# Patient Record
Sex: FEMALE | Race: WHITE | Employment: UNEMPLOYED
[De-identification: names, ages, dates, MRNs, and addresses within clinical notes are randomized per-mention and may not be internally consistent; named-entity substitution may affect disease eponyms.]

---

## 2017-01-11 ENCOUNTER — CARE COORDINATION (OUTPATIENT)
Dept: CARE COORDINATION | Facility: CLINIC | Age: 82
End: 2017-01-11

## 2017-03-03 ENCOUNTER — CARE COORDINATION (OUTPATIENT)
Dept: CARE COORDINATION | Facility: CLINIC | Age: 82
End: 2017-03-03

## 2017-03-20 ENCOUNTER — APPOINTMENT (OUTPATIENT)
Dept: CT IMAGING | Age: 82
DRG: 871 | End: 2017-03-20
Payer: MEDICARE

## 2017-03-20 ENCOUNTER — HOSPITAL ENCOUNTER (INPATIENT)
Age: 82
LOS: 10 days | Discharge: NURSING, REHAB OR LONG-TERM CARE | DRG: 871 | End: 2017-03-30
Attending: EMERGENCY MEDICINE | Admitting: FAMILY MEDICINE
Payer: MEDICARE

## 2017-03-20 ENCOUNTER — APPOINTMENT (OUTPATIENT)
Dept: GENERAL RADIOLOGY | Age: 82
DRG: 871 | End: 2017-03-20
Payer: MEDICARE

## 2017-03-20 DIAGNOSIS — J18.9 PNEUMONIA DUE TO ORGANISM: Primary | ICD-10-CM

## 2017-03-20 LAB
-: ABNORMAL
ABSOLUTE BANDS #: 1.3 K/UL (ref 0–1)
ABSOLUTE EOS #: 0 K/UL (ref 0–0.4)
ABSOLUTE LYMPH #: 0.43 K/UL (ref 1–4.8)
ABSOLUTE MONO #: 2.6 K/UL (ref 0.1–1.3)
ALBUMIN SERPL-MCNC: 2.1 G/DL (ref 3.5–5.2)
ALBUMIN/GLOBULIN RATIO: ABNORMAL (ref 1–2.5)
ALP BLD-CCNC: 134 U/L (ref 35–104)
ALT SERPL-CCNC: 11 U/L (ref 5–33)
AMORPHOUS: ABNORMAL
ANION GAP SERPL CALCULATED.3IONS-SCNC: 14 MMOL/L (ref 9–17)
AST SERPL-CCNC: 25 U/L
BACTERIA: ABNORMAL
BANDS: 3 % (ref 0–10)
BASOPHILS # BLD: 0 % (ref 0–2)
BASOPHILS ABSOLUTE: 0 K/UL (ref 0–0.2)
BILIRUB SERPL-MCNC: 2.22 MG/DL (ref 0.3–1.2)
BILIRUBIN URINE: ABNORMAL
BUN BLDV-MCNC: 29 MG/DL (ref 8–23)
BUN/CREAT BLD: ABNORMAL (ref 9–20)
CALCIUM SERPL-MCNC: 9.2 MG/DL (ref 8.6–10.4)
CASTS UA: ABNORMAL /LPF
CHLORIDE BLD-SCNC: 98 MMOL/L (ref 98–107)
CO2: 22 MMOL/L (ref 20–31)
COLOR: ABNORMAL
COMMENT UA: ABNORMAL
CREAT SERPL-MCNC: 0.63 MG/DL (ref 0.5–0.9)
CRYSTALS, UA: ABNORMAL /HPF
DIFFERENTIAL TYPE: ABNORMAL
EOSINOPHILS RELATIVE PERCENT: 0 % (ref 0–4)
EPITHELIAL CELLS UA: ABNORMAL /HPF
GFR AFRICAN AMERICAN: >60 ML/MIN
GFR NON-AFRICAN AMERICAN: >60 ML/MIN
GFR SERPL CREATININE-BSD FRML MDRD: ABNORMAL ML/MIN/{1.73_M2}
GFR SERPL CREATININE-BSD FRML MDRD: ABNORMAL ML/MIN/{1.73_M2}
GLUCOSE BLD-MCNC: 81 MG/DL (ref 65–105)
GLUCOSE BLD-MCNC: 97 MG/DL (ref 70–99)
GLUCOSE URINE: NEGATIVE
HCT VFR BLD CALC: 42.2 % (ref 36–46)
HEMOGLOBIN: 13.8 G/DL (ref 12–16)
INR BLD: 1.2
KETONES, URINE: ABNORMAL
LACTIC ACID: 2.3 MMOL/L (ref 0.5–2.2)
LEUKOCYTE ESTERASE, URINE: ABNORMAL
LYMPHOCYTES # BLD: 1 % (ref 24–44)
MAGNESIUM: 2.3 MG/DL (ref 1.6–2.6)
MCH RBC QN AUTO: 33.3 PG (ref 26–34)
MCHC RBC AUTO-ENTMCNC: 32.7 G/DL (ref 31–37)
MCV RBC AUTO: 102 FL (ref 80–100)
MONOCYTES # BLD: 6 % (ref 1–7)
MORPHOLOGY: ABNORMAL
MUCUS: ABNORMAL
NITRITE, URINE: POSITIVE
OTHER OBSERVATIONS UA: ABNORMAL
PARTIAL THROMBOPLASTIN TIME: 27.8 SEC (ref 23–31)
PDW BLD-RTO: 14.7 % (ref 11.5–14.9)
PH UA: 5.5 (ref 5–8)
PLATELET # BLD: 204 K/UL (ref 150–450)
PLATELET ESTIMATE: ABNORMAL
PMV BLD AUTO: 10.4 FL (ref 6–12)
POTASSIUM SERPL-SCNC: 4.1 MMOL/L (ref 3.7–5.3)
PROTEIN UA: ABNORMAL
PROTHROMBIN TIME: 12.7 SEC (ref 9.7–12)
RBC # BLD: 4.14 M/UL (ref 4–5.2)
RBC # BLD: ABNORMAL 10*6/UL
RBC UA: ABNORMAL /HPF
RENAL EPITHELIAL, UA: ABNORMAL /HPF
SEG NEUTROPHILS: 90 % (ref 36–66)
SEGMENTED NEUTROPHILS ABSOLUTE COUNT: 39.07 K/UL (ref 1.3–9.1)
SODIUM BLD-SCNC: 134 MMOL/L (ref 135–144)
SPECIFIC GRAVITY UA: 1.03 (ref 1–1.03)
TOTAL PROTEIN: 7.7 G/DL (ref 6.4–8.3)
TRICHOMONAS: ABNORMAL
TROPONIN INTERP: ABNORMAL
TROPONIN INTERP: ABNORMAL
TROPONIN T: 0.07 NG/ML
TROPONIN T: 0.09 NG/ML
TURBIDITY: ABNORMAL
URINE HGB: ABNORMAL
UROBILINOGEN, URINE: ABNORMAL
WBC # BLD: 43.4 K/UL (ref 3.5–11)
WBC # BLD: ABNORMAL 10*3/UL
WBC UA: ABNORMAL /HPF
YEAST: ABNORMAL

## 2017-03-20 PROCEDURE — 87040 BLOOD CULTURE FOR BACTERIA: CPT

## 2017-03-20 PROCEDURE — 85025 COMPLETE CBC W/AUTO DIFF WBC: CPT

## 2017-03-20 PROCEDURE — 2060000000 HC ICU INTERMEDIATE R&B

## 2017-03-20 PROCEDURE — 85610 PROTHROMBIN TIME: CPT

## 2017-03-20 PROCEDURE — 87186 SC STD MICRODIL/AGAR DIL: CPT

## 2017-03-20 PROCEDURE — 84484 ASSAY OF TROPONIN QUANT: CPT

## 2017-03-20 PROCEDURE — 99285 EMERGENCY DEPT VISIT HI MDM: CPT

## 2017-03-20 PROCEDURE — 2580000003 HC RX 258: Performed by: FAMILY MEDICINE

## 2017-03-20 PROCEDURE — 2580000003 HC RX 258: Performed by: EMERGENCY MEDICINE

## 2017-03-20 PROCEDURE — 93005 ELECTROCARDIOGRAM TRACING: CPT

## 2017-03-20 PROCEDURE — 85730 THROMBOPLASTIN TIME PARTIAL: CPT

## 2017-03-20 PROCEDURE — 70450 CT HEAD/BRAIN W/O DYE: CPT

## 2017-03-20 PROCEDURE — 81001 URINALYSIS AUTO W/SCOPE: CPT

## 2017-03-20 PROCEDURE — 6360000002 HC RX W HCPCS: Performed by: EMERGENCY MEDICINE

## 2017-03-20 PROCEDURE — 83605 ASSAY OF LACTIC ACID: CPT

## 2017-03-20 PROCEDURE — 87077 CULTURE AEROBIC IDENTIFY: CPT

## 2017-03-20 PROCEDURE — 80053 COMPREHEN METABOLIC PANEL: CPT

## 2017-03-20 PROCEDURE — 87086 URINE CULTURE/COLONY COUNT: CPT

## 2017-03-20 PROCEDURE — 87149 DNA/RNA DIRECT PROBE: CPT

## 2017-03-20 PROCEDURE — 83735 ASSAY OF MAGNESIUM: CPT

## 2017-03-20 PROCEDURE — 71010 XR CHEST PORTABLE: CPT

## 2017-03-20 PROCEDURE — 36415 COLL VENOUS BLD VENIPUNCTURE: CPT

## 2017-03-20 PROCEDURE — 87205 SMEAR GRAM STAIN: CPT

## 2017-03-20 PROCEDURE — 82947 ASSAY GLUCOSE BLOOD QUANT: CPT

## 2017-03-20 RX ORDER — 0.9 % SODIUM CHLORIDE 0.9 %
1000 INTRAVENOUS SOLUTION INTRAVENOUS ONCE
Status: COMPLETED | OUTPATIENT
Start: 2017-03-20 | End: 2017-03-20

## 2017-03-20 RX ORDER — WARFARIN SODIUM 5 MG/1
5 TABLET ORAL
Status: DISCONTINUED | OUTPATIENT
Start: 2017-03-20 | End: 2017-03-20

## 2017-03-20 RX ORDER — DONEPEZIL HYDROCHLORIDE 10 MG/1
10 TABLET, FILM COATED ORAL NIGHTLY
Status: DISCONTINUED | OUTPATIENT
Start: 2017-03-20 | End: 2017-03-30 | Stop reason: HOSPADM

## 2017-03-20 RX ORDER — SODIUM CHLORIDE 9 MG/ML
INJECTION, SOLUTION INTRAVENOUS CONTINUOUS
Status: DISCONTINUED | OUTPATIENT
Start: 2017-03-20 | End: 2017-03-21

## 2017-03-20 RX ORDER — SIMVASTATIN 20 MG
20 TABLET ORAL NIGHTLY
Status: DISCONTINUED | OUTPATIENT
Start: 2017-03-20 | End: 2017-03-30 | Stop reason: HOSPADM

## 2017-03-20 RX ORDER — WARFARIN SODIUM 5 MG/1
5 TABLET ORAL DAILY
Status: DISCONTINUED | OUTPATIENT
Start: 2017-03-20 | End: 2017-03-30 | Stop reason: HOSPADM

## 2017-03-20 RX ORDER — ASPIRIN 81 MG/1
81 TABLET ORAL DAILY
Status: DISCONTINUED | OUTPATIENT
Start: 2017-03-20 | End: 2017-03-30 | Stop reason: HOSPADM

## 2017-03-20 RX ORDER — ACETAMINOPHEN 325 MG/1
650 TABLET ORAL EVERY 4 HOURS PRN
Status: DISCONTINUED | OUTPATIENT
Start: 2017-03-20 | End: 2017-03-30 | Stop reason: HOSPADM

## 2017-03-20 RX ORDER — WARFARIN SODIUM 5 MG/1
5 TABLET ORAL DAILY
Status: DISCONTINUED | OUTPATIENT
Start: 2017-03-20 | End: 2017-03-20

## 2017-03-20 RX ORDER — POTASSIUM CHLORIDE 1.5 G/1.77G
20 POWDER, FOR SOLUTION ORAL DAILY
Status: DISCONTINUED | OUTPATIENT
Start: 2017-03-20 | End: 2017-03-30 | Stop reason: HOSPADM

## 2017-03-20 RX ORDER — SODIUM CHLORIDE 0.9 % (FLUSH) 0.9 %
10 SYRINGE (ML) INJECTION EVERY 12 HOURS SCHEDULED
Status: DISCONTINUED | OUTPATIENT
Start: 2017-03-20 | End: 2017-03-30 | Stop reason: HOSPADM

## 2017-03-20 RX ORDER — 0.9 % SODIUM CHLORIDE 0.9 %
500 INTRAVENOUS SOLUTION INTRAVENOUS ONCE
Status: COMPLETED | OUTPATIENT
Start: 2017-03-20 | End: 2017-03-20

## 2017-03-20 RX ORDER — SODIUM CHLORIDE 0.9 % (FLUSH) 0.9 %
10 SYRINGE (ML) INJECTION PRN
Status: DISCONTINUED | OUTPATIENT
Start: 2017-03-20 | End: 2017-03-30 | Stop reason: HOSPADM

## 2017-03-20 RX ORDER — LANOLIN ALCOHOL/MO/W.PET/CERES
1000 CREAM (GRAM) TOPICAL DAILY
Status: DISCONTINUED | OUTPATIENT
Start: 2017-03-20 | End: 2017-03-30 | Stop reason: HOSPADM

## 2017-03-20 RX ADMIN — AZITHROMYCIN MONOHYDRATE 500 MG: 500 INJECTION, POWDER, LYOPHILIZED, FOR SOLUTION INTRAVENOUS at 15:18

## 2017-03-20 RX ADMIN — SODIUM CHLORIDE: 9 INJECTION, SOLUTION INTRAVENOUS at 17:24

## 2017-03-20 RX ADMIN — CEFTRIAXONE SODIUM 1 G: 1 INJECTION, POWDER, FOR SOLUTION INTRAMUSCULAR; INTRAVENOUS at 14:45

## 2017-03-20 RX ADMIN — SODIUM CHLORIDE 1000 ML: 9 INJECTION, SOLUTION INTRAVENOUS at 14:45

## 2017-03-20 RX ADMIN — SODIUM CHLORIDE: 9 INJECTION, SOLUTION INTRAVENOUS at 21:53

## 2017-03-20 RX ADMIN — SODIUM CHLORIDE 500 ML: 9 INJECTION, SOLUTION INTRAVENOUS at 17:24

## 2017-03-20 ASSESSMENT — PAIN SCALES - GENERAL: PAINLEVEL_OUTOF10: 0

## 2017-03-21 ENCOUNTER — APPOINTMENT (OUTPATIENT)
Dept: CT IMAGING | Age: 82
DRG: 871 | End: 2017-03-21
Payer: MEDICARE

## 2017-03-21 ENCOUNTER — APPOINTMENT (OUTPATIENT)
Dept: GENERAL RADIOLOGY | Age: 82
DRG: 871 | End: 2017-03-21
Payer: MEDICARE

## 2017-03-21 PROBLEM — I95.9 HYPOTENSION: Status: ACTIVE | Noted: 2017-03-21

## 2017-03-21 PROBLEM — J18.9 SEPSIS DUE TO PNEUMONIA (HCC): Status: ACTIVE | Noted: 2017-03-21

## 2017-03-21 PROBLEM — A41.9 SEPSIS DUE TO PNEUMONIA (HCC): Status: ACTIVE | Noted: 2017-03-21

## 2017-03-21 LAB
ABSOLUTE BANDS #: 1.29 K/UL (ref 0–1)
ABSOLUTE EOS #: 0 K/UL (ref 0–0.4)
ABSOLUTE LYMPH #: 2.02 K/UL (ref 1–4.8)
ABSOLUTE MONO #: 0.92 K/UL (ref 0.1–1.3)
ANION GAP SERPL CALCULATED.3IONS-SCNC: 9 MMOL/L (ref 9–17)
BANDS: 7 % (ref 0–10)
BASOPHILS # BLD: 0 % (ref 0–2)
BASOPHILS ABSOLUTE: 0 K/UL (ref 0–0.2)
BUN BLDV-MCNC: 29 MG/DL (ref 8–23)
BUN/CREAT BLD: ABNORMAL (ref 9–20)
CALCIUM SERPL-MCNC: 8.7 MG/DL (ref 8.6–10.4)
CHLORIDE BLD-SCNC: 108 MMOL/L (ref 98–107)
CO2: 22 MMOL/L (ref 20–31)
CREAT SERPL-MCNC: 0.44 MG/DL (ref 0.5–0.9)
DIFFERENTIAL TYPE: ABNORMAL
DIRECT EXAM: NORMAL
EOSINOPHILS RELATIVE PERCENT: 0 % (ref 0–4)
GFR AFRICAN AMERICAN: >60 ML/MIN
GFR NON-AFRICAN AMERICAN: >60 ML/MIN
GFR SERPL CREATININE-BSD FRML MDRD: ABNORMAL ML/MIN/{1.73_M2}
GFR SERPL CREATININE-BSD FRML MDRD: ABNORMAL ML/MIN/{1.73_M2}
GLUCOSE BLD-MCNC: 116 MG/DL (ref 70–99)
HCT VFR BLD CALC: 37.7 % (ref 36–46)
HEMOGLOBIN: 12.3 G/DL (ref 12–16)
INR BLD: 1.2
LACTIC ACID: 1.3 MMOL/L (ref 0.5–2.2)
LYMPHOCYTES # BLD: 11 % (ref 24–44)
Lab: NORMAL
MCH RBC QN AUTO: 33.3 PG (ref 26–34)
MCHC RBC AUTO-ENTMCNC: 32.5 G/DL (ref 31–37)
MCV RBC AUTO: 102.2 FL (ref 80–100)
MONOCYTES # BLD: 5 % (ref 1–7)
MORPHOLOGY: ABNORMAL
PDW BLD-RTO: 14.4 % (ref 11.5–14.9)
PLATELET # BLD: 198 K/UL (ref 150–450)
PLATELET ESTIMATE: ABNORMAL
PMV BLD AUTO: 9.9 FL (ref 6–12)
POTASSIUM SERPL-SCNC: 3.6 MMOL/L (ref 3.7–5.3)
PROTHROMBIN TIME: 13 SEC (ref 9.7–12)
RBC # BLD: 3.69 M/UL (ref 4–5.2)
RBC # BLD: ABNORMAL 10*6/UL
SEG NEUTROPHILS: 77 % (ref 36–66)
SEGMENTED NEUTROPHILS ABSOLUTE COUNT: 14.17 K/UL (ref 1.3–9.1)
SODIUM BLD-SCNC: 139 MMOL/L (ref 135–144)
SPECIMEN DESCRIPTION: NORMAL
SPECIMEN DESCRIPTION: NORMAL
STATUS: NORMAL
WBC # BLD: 18.4 K/UL (ref 3.5–11)
WBC # BLD: ABNORMAL 10*3/UL

## 2017-03-21 PROCEDURE — 2500000003 HC RX 250 WO HCPCS: Performed by: FAMILY MEDICINE

## 2017-03-21 PROCEDURE — 87040 BLOOD CULTURE FOR BACTERIA: CPT

## 2017-03-21 PROCEDURE — 71250 CT THORAX DX C-: CPT

## 2017-03-21 PROCEDURE — 6360000002 HC RX W HCPCS: Performed by: FAMILY MEDICINE

## 2017-03-21 PROCEDURE — 85610 PROTHROMBIN TIME: CPT

## 2017-03-21 PROCEDURE — 36415 COLL VENOUS BLD VENIPUNCTURE: CPT

## 2017-03-21 PROCEDURE — 2580000003 HC RX 258: Performed by: FAMILY MEDICINE

## 2017-03-21 PROCEDURE — 74230 X-RAY XM SWLNG FUNCJ C+: CPT

## 2017-03-21 PROCEDURE — 99222 1ST HOSP IP/OBS MODERATE 55: CPT | Performed by: INTERNAL MEDICINE

## 2017-03-21 PROCEDURE — 99223 1ST HOSP IP/OBS HIGH 75: CPT | Performed by: FAMILY MEDICINE

## 2017-03-21 PROCEDURE — G8996 SWALLOW CURRENT STATUS: HCPCS

## 2017-03-21 PROCEDURE — 83605 ASSAY OF LACTIC ACID: CPT

## 2017-03-21 PROCEDURE — G8997 SWALLOW GOAL STATUS: HCPCS

## 2017-03-21 PROCEDURE — 87804 INFLUENZA ASSAY W/OPTIC: CPT

## 2017-03-21 PROCEDURE — 92610 EVALUATE SWALLOWING FUNCTION: CPT

## 2017-03-21 PROCEDURE — 2060000000 HC ICU INTERMEDIATE R&B

## 2017-03-21 PROCEDURE — 80048 BASIC METABOLIC PNL TOTAL CA: CPT

## 2017-03-21 PROCEDURE — 6360000002 HC RX W HCPCS: Performed by: INTERNAL MEDICINE

## 2017-03-21 PROCEDURE — 92611 MOTION FLUOROSCOPY/SWALLOW: CPT

## 2017-03-21 PROCEDURE — 85025 COMPLETE CBC W/AUTO DIFF WBC: CPT

## 2017-03-21 RX ORDER — DEXTROSE, SODIUM CHLORIDE, AND POTASSIUM CHLORIDE 5; .45; .3 G/100ML; G/100ML; G/100ML
INJECTION INTRAVENOUS CONTINUOUS
Status: DISCONTINUED | OUTPATIENT
Start: 2017-03-21 | End: 2017-03-23

## 2017-03-21 RX ORDER — LINEZOLID 2 MG/ML
600 INJECTION, SOLUTION INTRAVENOUS EVERY 12 HOURS
Status: DISCONTINUED | OUTPATIENT
Start: 2017-03-21 | End: 2017-03-22

## 2017-03-21 RX ORDER — 0.9 % SODIUM CHLORIDE 0.9 %
500 INTRAVENOUS SOLUTION INTRAVENOUS ONCE
Status: COMPLETED | OUTPATIENT
Start: 2017-03-21 | End: 2017-03-21

## 2017-03-21 RX ADMIN — SODIUM CHLORIDE 500 ML: 9 INJECTION, SOLUTION INTRAVENOUS at 00:48

## 2017-03-21 RX ADMIN — POTASSIUM CHLORIDE, DEXTROSE MONOHYDRATE AND SODIUM CHLORIDE: 300; 5; 450 INJECTION, SOLUTION INTRAVENOUS at 16:03

## 2017-03-21 RX ADMIN — CEFTRIAXONE SODIUM 1 G: 1 INJECTION, POWDER, FOR SOLUTION INTRAMUSCULAR; INTRAVENOUS at 16:03

## 2017-03-21 RX ADMIN — LINEZOLID 600 MG: 600 INJECTION, SOLUTION INTRAVENOUS at 13:12

## 2017-03-21 RX ADMIN — AZITHROMYCIN MONOHYDRATE 500 MG: 500 INJECTION, POWDER, LYOPHILIZED, FOR SOLUTION INTRAVENOUS at 16:50

## 2017-03-21 ASSESSMENT — PAIN SCALES - GENERAL: PAINLEVEL_OUTOF10: 0

## 2017-03-22 PROBLEM — B95.5 STREPTOCOCCAL BACTEREMIA: Status: ACTIVE | Noted: 2017-03-22

## 2017-03-22 PROBLEM — N39.0 URINARY TRACT INFECTION DUE TO KLEBSIELLA SPECIES: Status: ACTIVE | Noted: 2017-03-22

## 2017-03-22 PROBLEM — B96.89 URINARY TRACT INFECTION DUE TO KLEBSIELLA SPECIES: Status: ACTIVE | Noted: 2017-03-22

## 2017-03-22 PROBLEM — R78.81 STREPTOCOCCAL BACTEREMIA: Status: ACTIVE | Noted: 2017-03-22

## 2017-03-22 PROBLEM — J13 PNEUMOCOCCAL PNEUMONIA (HCC): Status: ACTIVE | Noted: 2017-03-22

## 2017-03-22 LAB
CULTURE: 12
CULTURE: 12
CULTURE: ABNORMAL
INR BLD: 1.1
LV EF: 55 %
LVEF MODALITY: NORMAL
Lab: ABNORMAL
ORGANISM: ABNORMAL
ORGANISM: ABNORMAL
PROTHROMBIN TIME: 11.9 SEC (ref 9.7–12)
SPECIMEN DESCRIPTION: ABNORMAL
STATUS: ABNORMAL

## 2017-03-22 PROCEDURE — 93306 TTE W/DOPPLER COMPLETE: CPT

## 2017-03-22 PROCEDURE — 6360000002 HC RX W HCPCS: Performed by: INTERNAL MEDICINE

## 2017-03-22 PROCEDURE — 85610 PROTHROMBIN TIME: CPT

## 2017-03-22 PROCEDURE — 2060000000 HC ICU INTERMEDIATE R&B

## 2017-03-22 PROCEDURE — 2500000003 HC RX 250 WO HCPCS: Performed by: FAMILY MEDICINE

## 2017-03-22 PROCEDURE — 99233 SBSQ HOSP IP/OBS HIGH 50: CPT | Performed by: INTERNAL MEDICINE

## 2017-03-22 PROCEDURE — 2580000003 HC RX 258: Performed by: INTERNAL MEDICINE

## 2017-03-22 PROCEDURE — 6360000002 HC RX W HCPCS: Performed by: FAMILY MEDICINE

## 2017-03-22 PROCEDURE — 99233 SBSQ HOSP IP/OBS HIGH 50: CPT | Performed by: FAMILY MEDICINE

## 2017-03-22 PROCEDURE — 92526 ORAL FUNCTION THERAPY: CPT

## 2017-03-22 PROCEDURE — 36415 COLL VENOUS BLD VENIPUNCTURE: CPT

## 2017-03-22 PROCEDURE — 99221 1ST HOSP IP/OBS SF/LOW 40: CPT | Performed by: INTERNAL MEDICINE

## 2017-03-22 RX ADMIN — POTASSIUM CHLORIDE, DEXTROSE MONOHYDRATE AND SODIUM CHLORIDE: 300; 5; 450 INJECTION, SOLUTION INTRAVENOUS at 05:46

## 2017-03-22 RX ADMIN — LINEZOLID 600 MG: 600 INJECTION, SOLUTION INTRAVENOUS at 01:13

## 2017-03-22 RX ADMIN — ENOXAPARIN SODIUM 40 MG: 40 INJECTION SUBCUTANEOUS at 08:55

## 2017-03-22 RX ADMIN — PIPERACILLIN SODIUM,TAZOBACTAM SODIUM 3.38 G: 3; .375 INJECTION, POWDER, FOR SOLUTION INTRAVENOUS at 20:20

## 2017-03-22 RX ADMIN — PIPERACILLIN SODIUM,TAZOBACTAM SODIUM 3.38 G: 3; .375 INJECTION, POWDER, FOR SOLUTION INTRAVENOUS at 13:15

## 2017-03-22 RX ADMIN — METOROPROLOL TARTRATE 2.5 MG: 5 INJECTION, SOLUTION INTRAVENOUS at 22:41

## 2017-03-22 RX ADMIN — POTASSIUM CHLORIDE, DEXTROSE MONOHYDRATE AND SODIUM CHLORIDE: 300; 5; 450 INJECTION, SOLUTION INTRAVENOUS at 22:41

## 2017-03-22 ASSESSMENT — PAIN SCALES - GENERAL
PAINLEVEL_OUTOF10: 0

## 2017-03-23 ENCOUNTER — ANESTHESIA (OUTPATIENT)
Dept: OPERATING ROOM | Age: 82
DRG: 871 | End: 2017-03-23
Payer: MEDICARE

## 2017-03-23 ENCOUNTER — ANESTHESIA EVENT (OUTPATIENT)
Dept: OPERATING ROOM | Age: 82
DRG: 871 | End: 2017-03-23
Payer: MEDICARE

## 2017-03-23 VITALS — DIASTOLIC BLOOD PRESSURE: 62 MMHG | OXYGEN SATURATION: 93 % | SYSTOLIC BLOOD PRESSURE: 103 MMHG

## 2017-03-23 LAB
INR BLD: 1.1
PROTHROMBIN TIME: 11.4 SEC (ref 9.7–12)

## 2017-03-23 PROCEDURE — BD12YZZ FLUOROSCOPY OF STOMACH USING OTHER CONTRAST: ICD-10-PCS | Performed by: INTERNAL MEDICINE

## 2017-03-23 PROCEDURE — 7100000000 HC PACU RECOVERY - FIRST 15 MIN: Performed by: INTERNAL MEDICINE

## 2017-03-23 PROCEDURE — 99232 SBSQ HOSP IP/OBS MODERATE 35: CPT | Performed by: FAMILY MEDICINE

## 2017-03-23 PROCEDURE — 3700000000 HC ANESTHESIA ATTENDED CARE: Performed by: INTERNAL MEDICINE

## 2017-03-23 PROCEDURE — 3700000001 HC ADD 15 MINUTES (ANESTHESIA): Performed by: INTERNAL MEDICINE

## 2017-03-23 PROCEDURE — 2500000003 HC RX 250 WO HCPCS: Performed by: FAMILY MEDICINE

## 2017-03-23 PROCEDURE — 6360000002 HC RX W HCPCS

## 2017-03-23 PROCEDURE — 85610 PROTHROMBIN TIME: CPT

## 2017-03-23 PROCEDURE — 3609018000 HC EGD ESOPHAGOGASTRODUODENOSCOPY PEG TUBE INSERTION: Performed by: INTERNAL MEDICINE

## 2017-03-23 PROCEDURE — 6360000002 HC RX W HCPCS: Performed by: INTERNAL MEDICINE

## 2017-03-23 PROCEDURE — 2580000003 HC RX 258: Performed by: ANESTHESIOLOGY

## 2017-03-23 PROCEDURE — 7100000001 HC PACU RECOVERY - ADDTL 15 MIN: Performed by: INTERNAL MEDICINE

## 2017-03-23 PROCEDURE — 2580000003 HC RX 258: Performed by: INTERNAL MEDICINE

## 2017-03-23 PROCEDURE — 0DH63UZ INSERTION OF FEEDING DEVICE INTO STOMACH, PERCUTANEOUS APPROACH: ICD-10-PCS | Performed by: INTERNAL MEDICINE

## 2017-03-23 PROCEDURE — 36415 COLL VENOUS BLD VENIPUNCTURE: CPT

## 2017-03-23 PROCEDURE — 43246 EGD PLACE GASTROSTOMY TUBE: CPT | Performed by: INTERNAL MEDICINE

## 2017-03-23 PROCEDURE — 2060000000 HC ICU INTERMEDIATE R&B

## 2017-03-23 PROCEDURE — 6370000000 HC RX 637 (ALT 250 FOR IP): Performed by: INTERNAL MEDICINE

## 2017-03-23 RX ORDER — DEXTROSE, SODIUM CHLORIDE, AND POTASSIUM CHLORIDE 5; .45; .3 G/100ML; G/100ML; G/100ML
INJECTION INTRAVENOUS CONTINUOUS
Status: DISCONTINUED | OUTPATIENT
Start: 2017-03-23 | End: 2017-03-30 | Stop reason: HOSPADM

## 2017-03-23 RX ORDER — PROPOFOL 10 MG/ML
INJECTION, EMULSION INTRAVENOUS PRN
Status: DISCONTINUED | OUTPATIENT
Start: 2017-03-23 | End: 2017-03-23 | Stop reason: SDUPTHER

## 2017-03-23 RX ORDER — SODIUM CHLORIDE, SODIUM LACTATE, POTASSIUM CHLORIDE, CALCIUM CHLORIDE 600; 310; 30; 20 MG/100ML; MG/100ML; MG/100ML; MG/100ML
INJECTION, SOLUTION INTRAVENOUS CONTINUOUS
Status: DISCONTINUED | OUTPATIENT
Start: 2017-03-23 | End: 2017-03-23

## 2017-03-23 RX ORDER — MIDAZOLAM HYDROCHLORIDE 1 MG/ML
INJECTION INTRAMUSCULAR; INTRAVENOUS PRN
Status: DISCONTINUED | OUTPATIENT
Start: 2017-03-23 | End: 2017-03-23 | Stop reason: SDUPTHER

## 2017-03-23 RX ADMIN — PIPERACILLIN SODIUM,TAZOBACTAM SODIUM 3.38 G: 3; .375 INJECTION, POWDER, FOR SOLUTION INTRAVENOUS at 03:05

## 2017-03-23 RX ADMIN — MIDAZOLAM HYDROCHLORIDE 1 MG: 1 INJECTION, SOLUTION INTRAMUSCULAR; INTRAVENOUS at 10:04

## 2017-03-23 RX ADMIN — POTASSIUM CHLORIDE, DEXTROSE MONOHYDRATE AND SODIUM CHLORIDE: 300; 5; 450 INJECTION, SOLUTION INTRAVENOUS at 20:58

## 2017-03-23 RX ADMIN — PROPOFOL 10 MG: 10 INJECTION, EMULSION INTRAVENOUS at 10:20

## 2017-03-23 RX ADMIN — PROPOFOL 10 MG: 10 INJECTION, EMULSION INTRAVENOUS at 10:12

## 2017-03-23 RX ADMIN — PROPOFOL 10 MG: 10 INJECTION, EMULSION INTRAVENOUS at 10:06

## 2017-03-23 RX ADMIN — PROPOFOL 10 MG: 10 INJECTION, EMULSION INTRAVENOUS at 10:21

## 2017-03-23 RX ADMIN — PROPOFOL 10 MG: 10 INJECTION, EMULSION INTRAVENOUS at 10:10

## 2017-03-23 RX ADMIN — PROPOFOL 10 MG: 10 INJECTION, EMULSION INTRAVENOUS at 10:17

## 2017-03-23 RX ADMIN — POTASSIUM CHLORIDE, DEXTROSE MONOHYDRATE AND SODIUM CHLORIDE: 300; 5; 450 INJECTION, SOLUTION INTRAVENOUS at 12:38

## 2017-03-23 RX ADMIN — PROPOFOL 10 MG: 10 INJECTION, EMULSION INTRAVENOUS at 10:28

## 2017-03-23 RX ADMIN — MIDAZOLAM HYDROCHLORIDE 1 MG: 1 INJECTION, SOLUTION INTRAMUSCULAR; INTRAVENOUS at 09:58

## 2017-03-23 RX ADMIN — SODIUM CHLORIDE, POTASSIUM CHLORIDE, SODIUM LACTATE AND CALCIUM CHLORIDE: 600; 310; 30; 20 INJECTION, SOLUTION INTRAVENOUS at 09:18

## 2017-03-23 RX ADMIN — PROPOFOL 10 MG: 10 INJECTION, EMULSION INTRAVENOUS at 10:24

## 2017-03-23 RX ADMIN — PROPOFOL 10 MG: 10 INJECTION, EMULSION INTRAVENOUS at 10:08

## 2017-03-23 RX ADMIN — PROPOFOL 10 MG: 10 INJECTION, EMULSION INTRAVENOUS at 10:05

## 2017-03-23 RX ADMIN — PIPERACILLIN SODIUM,TAZOBACTAM SODIUM 3.38 G: 3; .375 INJECTION, POWDER, FOR SOLUTION INTRAVENOUS at 20:58

## 2017-03-23 RX ADMIN — PROPOFOL 10 MG: 10 INJECTION, EMULSION INTRAVENOUS at 10:14

## 2017-03-23 ASSESSMENT — PAIN SCALES - GENERAL
PAINLEVEL_OUTOF10: 0

## 2017-03-23 ASSESSMENT — ENCOUNTER SYMPTOMS
STRIDOR: 0
SHORTNESS OF BREATH: 0

## 2017-03-24 ENCOUNTER — APPOINTMENT (OUTPATIENT)
Dept: GENERAL RADIOLOGY | Age: 82
DRG: 871 | End: 2017-03-24
Payer: MEDICARE

## 2017-03-24 LAB
ANION GAP SERPL CALCULATED.3IONS-SCNC: 9 MMOL/L (ref 9–17)
BUN BLDV-MCNC: 8 MG/DL (ref 8–23)
BUN/CREAT BLD: ABNORMAL (ref 9–20)
CALCIUM SERPL-MCNC: 8.4 MG/DL (ref 8.6–10.4)
CHLORIDE BLD-SCNC: 108 MMOL/L (ref 98–107)
CO2: 21 MMOL/L (ref 20–31)
CREAT SERPL-MCNC: 0.46 MG/DL (ref 0.5–0.9)
GFR AFRICAN AMERICAN: >60 ML/MIN
GFR NON-AFRICAN AMERICAN: >60 ML/MIN
GFR SERPL CREATININE-BSD FRML MDRD: ABNORMAL ML/MIN/{1.73_M2}
GFR SERPL CREATININE-BSD FRML MDRD: ABNORMAL ML/MIN/{1.73_M2}
GLUCOSE BLD-MCNC: 90 MG/DL (ref 70–99)
HCT VFR BLD CALC: 38.9 % (ref 36–46)
HEMOGLOBIN: 12.8 G/DL (ref 12–16)
INR BLD: 1.1
MCH RBC QN AUTO: 33.3 PG (ref 26–34)
MCHC RBC AUTO-ENTMCNC: 33.1 G/DL (ref 31–37)
MCV RBC AUTO: 100.6 FL (ref 80–100)
PDW BLD-RTO: 14.6 % (ref 11.5–14.9)
PLATELET # BLD: 186 K/UL (ref 150–450)
PMV BLD AUTO: 9.8 FL (ref 6–12)
POTASSIUM SERPL-SCNC: 4.9 MMOL/L (ref 3.7–5.3)
PROTHROMBIN TIME: 11.5 SEC (ref 9.7–12)
RBC # BLD: 3.86 M/UL (ref 4–5.2)
SODIUM BLD-SCNC: 138 MMOL/L (ref 135–144)
WBC # BLD: 10.1 K/UL (ref 3.5–11)

## 2017-03-24 PROCEDURE — 99232 SBSQ HOSP IP/OBS MODERATE 35: CPT | Performed by: FAMILY MEDICINE

## 2017-03-24 PROCEDURE — G8987 SELF CARE CURRENT STATUS: HCPCS

## 2017-03-24 PROCEDURE — 71020 XR CHEST STANDARD TWO VW: CPT

## 2017-03-24 PROCEDURE — 99233 SBSQ HOSP IP/OBS HIGH 50: CPT | Performed by: INTERNAL MEDICINE

## 2017-03-24 PROCEDURE — 85610 PROTHROMBIN TIME: CPT

## 2017-03-24 PROCEDURE — 2500000003 HC RX 250 WO HCPCS: Performed by: FAMILY MEDICINE

## 2017-03-24 PROCEDURE — G8988 SELF CARE GOAL STATUS: HCPCS

## 2017-03-24 PROCEDURE — 97161 PT EVAL LOW COMPLEX 20 MIN: CPT

## 2017-03-24 PROCEDURE — 36415 COLL VENOUS BLD VENIPUNCTURE: CPT

## 2017-03-24 PROCEDURE — 2060000000 HC ICU INTERMEDIATE R&B

## 2017-03-24 PROCEDURE — G8978 MOBILITY CURRENT STATUS: HCPCS

## 2017-03-24 PROCEDURE — 2580000003 HC RX 258: Performed by: INTERNAL MEDICINE

## 2017-03-24 PROCEDURE — 80048 BASIC METABOLIC PNL TOTAL CA: CPT

## 2017-03-24 PROCEDURE — 99232 SBSQ HOSP IP/OBS MODERATE 35: CPT | Performed by: INTERNAL MEDICINE

## 2017-03-24 PROCEDURE — 6370000000 HC RX 637 (ALT 250 FOR IP): Performed by: FAMILY MEDICINE

## 2017-03-24 PROCEDURE — 97165 OT EVAL LOW COMPLEX 30 MIN: CPT

## 2017-03-24 PROCEDURE — G8979 MOBILITY GOAL STATUS: HCPCS

## 2017-03-24 PROCEDURE — 6360000002 HC RX W HCPCS: Performed by: INTERNAL MEDICINE

## 2017-03-24 PROCEDURE — 85027 COMPLETE CBC AUTOMATED: CPT

## 2017-03-24 PROCEDURE — 92526 ORAL FUNCTION THERAPY: CPT

## 2017-03-24 RX ADMIN — SIMVASTATIN 20 MG: 20 TABLET, FILM COATED ORAL at 21:04

## 2017-03-24 RX ADMIN — DONEPEZIL HYDROCHLORIDE 10 MG: 10 TABLET, FILM COATED ORAL at 21:04

## 2017-03-24 RX ADMIN — PIPERACILLIN SODIUM,TAZOBACTAM SODIUM 3.38 G: 3; .375 INJECTION, POWDER, FOR SOLUTION INTRAVENOUS at 21:04

## 2017-03-24 RX ADMIN — PIPERACILLIN SODIUM,TAZOBACTAM SODIUM 3.38 G: 3; .375 INJECTION, POWDER, FOR SOLUTION INTRAVENOUS at 03:16

## 2017-03-24 RX ADMIN — POTASSIUM CHLORIDE, DEXTROSE MONOHYDRATE AND SODIUM CHLORIDE: 300; 5; 450 INJECTION, SOLUTION INTRAVENOUS at 17:27

## 2017-03-24 RX ADMIN — WARFARIN SODIUM 5 MG: 5 TABLET ORAL at 17:43

## 2017-03-24 RX ADMIN — PIPERACILLIN SODIUM,TAZOBACTAM SODIUM 3.38 G: 3; .375 INJECTION, POWDER, FOR SOLUTION INTRAVENOUS at 11:44

## 2017-03-24 RX ADMIN — POTASSIUM CHLORIDE, DEXTROSE MONOHYDRATE AND SODIUM CHLORIDE: 300; 5; 450 INJECTION, SOLUTION INTRAVENOUS at 03:16

## 2017-03-25 LAB
ANION GAP SERPL CALCULATED.3IONS-SCNC: 10 MMOL/L (ref 9–17)
BUN BLDV-MCNC: 5 MG/DL (ref 8–23)
BUN/CREAT BLD: ABNORMAL (ref 9–20)
CALCIUM SERPL-MCNC: 8.7 MG/DL (ref 8.6–10.4)
CHLORIDE BLD-SCNC: 106 MMOL/L (ref 98–107)
CO2: 22 MMOL/L (ref 20–31)
CREAT SERPL-MCNC: 0.43 MG/DL (ref 0.5–0.9)
GFR AFRICAN AMERICAN: >60 ML/MIN
GFR NON-AFRICAN AMERICAN: >60 ML/MIN
GFR SERPL CREATININE-BSD FRML MDRD: ABNORMAL ML/MIN/{1.73_M2}
GFR SERPL CREATININE-BSD FRML MDRD: ABNORMAL ML/MIN/{1.73_M2}
GLUCOSE BLD-MCNC: 103 MG/DL (ref 70–99)
HCT VFR BLD CALC: 43.3 % (ref 36–46)
HEMOGLOBIN: 13.8 G/DL (ref 12–16)
INR BLD: 1.1
MCH RBC QN AUTO: 33.1 PG (ref 26–34)
MCHC RBC AUTO-ENTMCNC: 31.9 G/DL (ref 31–37)
MCV RBC AUTO: 104 FL (ref 80–100)
PDW BLD-RTO: 15.2 % (ref 11.5–14.9)
PLATELET # BLD: 192 K/UL (ref 150–450)
PMV BLD AUTO: 9.4 FL (ref 6–12)
POTASSIUM SERPL-SCNC: 4.4 MMOL/L (ref 3.7–5.3)
PROTHROMBIN TIME: 11.8 SEC (ref 9.7–12)
RBC # BLD: 4.16 M/UL (ref 4–5.2)
SODIUM BLD-SCNC: 138 MMOL/L (ref 135–144)
WBC # BLD: 12.2 K/UL (ref 3.5–11)

## 2017-03-25 PROCEDURE — 99231 SBSQ HOSP IP/OBS SF/LOW 25: CPT | Performed by: FAMILY MEDICINE

## 2017-03-25 PROCEDURE — 2500000003 HC RX 250 WO HCPCS: Performed by: FAMILY MEDICINE

## 2017-03-25 PROCEDURE — 2580000003 HC RX 258: Performed by: INTERNAL MEDICINE

## 2017-03-25 PROCEDURE — 80048 BASIC METABOLIC PNL TOTAL CA: CPT

## 2017-03-25 PROCEDURE — 2060000000 HC ICU INTERMEDIATE R&B

## 2017-03-25 PROCEDURE — 6370000000 HC RX 637 (ALT 250 FOR IP): Performed by: FAMILY MEDICINE

## 2017-03-25 PROCEDURE — 99232 SBSQ HOSP IP/OBS MODERATE 35: CPT | Performed by: INTERNAL MEDICINE

## 2017-03-25 PROCEDURE — 36415 COLL VENOUS BLD VENIPUNCTURE: CPT

## 2017-03-25 PROCEDURE — 85027 COMPLETE CBC AUTOMATED: CPT

## 2017-03-25 PROCEDURE — 99233 SBSQ HOSP IP/OBS HIGH 50: CPT | Performed by: INTERNAL MEDICINE

## 2017-03-25 PROCEDURE — 6360000002 HC RX W HCPCS: Performed by: INTERNAL MEDICINE

## 2017-03-25 PROCEDURE — 6370000000 HC RX 637 (ALT 250 FOR IP): Performed by: INTERNAL MEDICINE

## 2017-03-25 PROCEDURE — 85610 PROTHROMBIN TIME: CPT

## 2017-03-25 RX ORDER — AMOXICILLIN AND CLAVULANATE POTASSIUM 875; 125 MG/1; MG/1
1 TABLET, FILM COATED ORAL EVERY 12 HOURS SCHEDULED
Status: DISCONTINUED | OUTPATIENT
Start: 2017-03-25 | End: 2017-03-27

## 2017-03-25 RX ADMIN — POTASSIUM CHLORIDE, DEXTROSE MONOHYDRATE AND SODIUM CHLORIDE: 300; 5; 450 INJECTION, SOLUTION INTRAVENOUS at 13:44

## 2017-03-25 RX ADMIN — PIPERACILLIN SODIUM,TAZOBACTAM SODIUM 3.38 G: 3; .375 INJECTION, POWDER, FOR SOLUTION INTRAVENOUS at 12:19

## 2017-03-25 RX ADMIN — WARFARIN SODIUM 5 MG: 5 TABLET ORAL at 18:26

## 2017-03-25 RX ADMIN — CYANOCOBALAMIN TAB 1000 MCG 1000 MCG: 1000 TAB at 09:33

## 2017-03-25 RX ADMIN — POTASSIUM CHLORIDE 20 MEQ: 1.5 POWDER, FOR SOLUTION ORAL at 09:33

## 2017-03-25 RX ADMIN — AMOXICILLIN AND CLAVULANATE POTASSIUM 1 TABLET: 875; 125 TABLET, FILM COATED ORAL at 22:00

## 2017-03-25 RX ADMIN — POTASSIUM CHLORIDE, DEXTROSE MONOHYDRATE AND SODIUM CHLORIDE: 300; 5; 450 INJECTION, SOLUTION INTRAVENOUS at 03:36

## 2017-03-25 RX ADMIN — VITAMIN D, TAB 1000IU (100/BT) 2000 UNITS: 25 TAB at 09:33

## 2017-03-25 RX ADMIN — DONEPEZIL HYDROCHLORIDE 10 MG: 10 TABLET, FILM COATED ORAL at 22:02

## 2017-03-25 RX ADMIN — SIMVASTATIN 20 MG: 20 TABLET, FILM COATED ORAL at 22:00

## 2017-03-25 RX ADMIN — PIPERACILLIN SODIUM,TAZOBACTAM SODIUM 3.38 G: 3; .375 INJECTION, POWDER, FOR SOLUTION INTRAVENOUS at 04:15

## 2017-03-25 RX ADMIN — ASPIRIN 81 MG: 81 TABLET, COATED ORAL at 09:33

## 2017-03-25 ASSESSMENT — PAIN SCALES - GENERAL: PAINLEVEL_OUTOF10: 0

## 2017-03-26 ENCOUNTER — APPOINTMENT (OUTPATIENT)
Dept: GENERAL RADIOLOGY | Age: 82
DRG: 871 | End: 2017-03-26
Payer: MEDICARE

## 2017-03-26 ENCOUNTER — APPOINTMENT (OUTPATIENT)
Dept: CT IMAGING | Age: 82
DRG: 871 | End: 2017-03-26
Payer: MEDICARE

## 2017-03-26 LAB
ANION GAP SERPL CALCULATED.3IONS-SCNC: 9 MMOL/L (ref 9–17)
BUN BLDV-MCNC: 3 MG/DL (ref 8–23)
BUN/CREAT BLD: ABNORMAL (ref 9–20)
CALCIUM SERPL-MCNC: 9 MG/DL (ref 8.6–10.4)
CHLORIDE BLD-SCNC: 105 MMOL/L (ref 98–107)
CO2: 22 MMOL/L (ref 20–31)
CREAT SERPL-MCNC: <0.4 MG/DL (ref 0.5–0.9)
GFR AFRICAN AMERICAN: ABNORMAL ML/MIN
GFR NON-AFRICAN AMERICAN: ABNORMAL ML/MIN
GFR SERPL CREATININE-BSD FRML MDRD: ABNORMAL ML/MIN/{1.73_M2}
GFR SERPL CREATININE-BSD FRML MDRD: ABNORMAL ML/MIN/{1.73_M2}
GLUCOSE BLD-MCNC: 97 MG/DL (ref 70–99)
HCT VFR BLD CALC: 40.8 % (ref 36–46)
HEMOGLOBIN: 12.8 G/DL (ref 12–16)
INR BLD: 1.5
LACTIC ACID: 2 MMOL/L (ref 0.5–2.2)
MCH RBC QN AUTO: 32.9 PG (ref 26–34)
MCHC RBC AUTO-ENTMCNC: 31.4 G/DL (ref 31–37)
MCV RBC AUTO: 104.9 FL (ref 80–100)
PDW BLD-RTO: 15.8 % (ref 11.5–14.9)
PLATELET # BLD: 195 K/UL (ref 150–450)
PMV BLD AUTO: 9.5 FL (ref 6–12)
POTASSIUM SERPL-SCNC: 5 MMOL/L (ref 3.7–5.3)
PROCALCITONIN: 0.15 NG/ML
PROTHROMBIN TIME: 15.8 SEC (ref 9.7–12)
RBC # BLD: 3.89 M/UL (ref 4–5.2)
SODIUM BLD-SCNC: 136 MMOL/L (ref 135–144)
WBC # BLD: 13.2 K/UL (ref 3.5–11)

## 2017-03-26 PROCEDURE — 6360000004 HC RX CONTRAST MEDICATION: Performed by: FAMILY MEDICINE

## 2017-03-26 PROCEDURE — 74020 XR ABDOMEN STANDARD: CPT

## 2017-03-26 PROCEDURE — 85027 COMPLETE CBC AUTOMATED: CPT

## 2017-03-26 PROCEDURE — 6370000000 HC RX 637 (ALT 250 FOR IP): Performed by: INTERNAL MEDICINE

## 2017-03-26 PROCEDURE — 2580000003 HC RX 258: Performed by: FAMILY MEDICINE

## 2017-03-26 PROCEDURE — 36415 COLL VENOUS BLD VENIPUNCTURE: CPT

## 2017-03-26 PROCEDURE — 80048 BASIC METABOLIC PNL TOTAL CA: CPT

## 2017-03-26 PROCEDURE — 6370000000 HC RX 637 (ALT 250 FOR IP): Performed by: FAMILY MEDICINE

## 2017-03-26 PROCEDURE — 99232 SBSQ HOSP IP/OBS MODERATE 35: CPT | Performed by: INTERNAL MEDICINE

## 2017-03-26 PROCEDURE — 74160 CT ABDOMEN W/CONTRAST: CPT

## 2017-03-26 PROCEDURE — 85610 PROTHROMBIN TIME: CPT

## 2017-03-26 PROCEDURE — 83605 ASSAY OF LACTIC ACID: CPT

## 2017-03-26 PROCEDURE — 2500000003 HC RX 250 WO HCPCS: Performed by: INTERNAL MEDICINE

## 2017-03-26 PROCEDURE — 2060000000 HC ICU INTERMEDIATE R&B

## 2017-03-26 PROCEDURE — 84145 PROCALCITONIN (PCT): CPT

## 2017-03-26 RX ORDER — LANOLIN ALCOHOL/MO/W.PET/CERES
1000 CREAM (GRAM) TOPICAL DAILY
Qty: 30 TABLET | Refills: 3 | Status: SHIPPED | OUTPATIENT
Start: 2017-03-26

## 2017-03-26 RX ORDER — SIMVASTATIN 40 MG
20 TABLET ORAL NIGHTLY
Qty: 30 TABLET | Refills: 3 | Status: SHIPPED | OUTPATIENT
Start: 2017-03-26

## 2017-03-26 RX ORDER — POTASSIUM CHLORIDE 1.5 G/1.77G
20 POWDER, FOR SOLUTION ORAL DAILY
Qty: 30 EACH | Refills: 3 | Status: SHIPPED | OUTPATIENT
Start: 2017-03-26

## 2017-03-26 RX ORDER — WARFARIN SODIUM 5 MG/1
5 TABLET ORAL DAILY
Qty: 30 TABLET | Refills: 3 | Status: SHIPPED | OUTPATIENT
Start: 2017-03-26

## 2017-03-26 RX ORDER — ACETAMINOPHEN 325 MG/1
650 TABLET ORAL EVERY 4 HOURS PRN
Qty: 120 TABLET | Refills: 3 | Status: SHIPPED | OUTPATIENT
Start: 2017-03-26

## 2017-03-26 RX ORDER — CHOLECALCIFEROL (VITAMIN D3) 125 MCG
1 TABLET ORAL DAILY
Qty: 30 TABLET | Refills: 11 | Status: SHIPPED | OUTPATIENT
Start: 2017-03-26

## 2017-03-26 RX ORDER — DONEPEZIL HYDROCHLORIDE 10 MG/1
10 TABLET, FILM COATED ORAL NIGHTLY
Qty: 30 TABLET | Refills: 3 | Status: SHIPPED | OUTPATIENT
Start: 2017-03-26

## 2017-03-26 RX ORDER — 0.9 % SODIUM CHLORIDE 0.9 %
100 INTRAVENOUS SOLUTION INTRAVENOUS ONCE
Status: COMPLETED | OUTPATIENT
Start: 2017-03-26 | End: 2017-03-26

## 2017-03-26 RX ORDER — AMOXICILLIN AND CLAVULANATE POTASSIUM 875; 125 MG/1; MG/1
1 TABLET, FILM COATED ORAL EVERY 12 HOURS SCHEDULED
Qty: 20 TABLET | Refills: 0
Start: 2017-03-26 | End: 2017-03-30 | Stop reason: HOSPADM

## 2017-03-26 RX ORDER — CIPROFLOXACIN 2 MG/ML
400 INJECTION, SOLUTION INTRAVENOUS EVERY 12 HOURS
Status: DISCONTINUED | OUTPATIENT
Start: 2017-03-26 | End: 2017-03-27

## 2017-03-26 RX ADMIN — VITAMIN D, TAB 1000IU (100/BT) 2000 UNITS: 25 TAB at 10:54

## 2017-03-26 RX ADMIN — METRONIDAZOLE 500 MG: 500 INJECTION, SOLUTION INTRAVENOUS at 21:58

## 2017-03-26 RX ADMIN — ASPIRIN 81 MG: 81 TABLET, COATED ORAL at 10:54

## 2017-03-26 RX ADMIN — IOVERSOL 130 ML: 741 INJECTION INTRA-ARTERIAL; INTRAVENOUS at 14:30

## 2017-03-26 RX ADMIN — SIMVASTATIN 20 MG: 20 TABLET, FILM COATED ORAL at 22:00

## 2017-03-26 RX ADMIN — AMOXICILLIN AND CLAVULANATE POTASSIUM 1 TABLET: 875; 125 TABLET, FILM COATED ORAL at 21:58

## 2017-03-26 RX ADMIN — CYANOCOBALAMIN TAB 1000 MCG 1000 MCG: 1000 TAB at 10:55

## 2017-03-26 RX ADMIN — AMOXICILLIN AND CLAVULANATE POTASSIUM 1 TABLET: 875; 125 TABLET, FILM COATED ORAL at 10:53

## 2017-03-26 RX ADMIN — WARFARIN SODIUM 5 MG: 5 TABLET ORAL at 18:49

## 2017-03-26 RX ADMIN — DONEPEZIL HYDROCHLORIDE 10 MG: 10 TABLET, FILM COATED ORAL at 21:59

## 2017-03-26 RX ADMIN — POTASSIUM CHLORIDE, DEXTROSE MONOHYDRATE AND SODIUM CHLORIDE: 300; 5; 450 INJECTION, SOLUTION INTRAVENOUS at 02:12

## 2017-03-26 RX ADMIN — METRONIDAZOLE 500 MG: 500 INJECTION, SOLUTION INTRAVENOUS at 16:09

## 2017-03-26 RX ADMIN — POTASSIUM CHLORIDE, DEXTROSE MONOHYDRATE AND SODIUM CHLORIDE: 300; 5; 450 INJECTION, SOLUTION INTRAVENOUS at 22:11

## 2017-03-26 RX ADMIN — SODIUM CHLORIDE 100 ML: 0.9 INJECTION, SOLUTION INTRAVENOUS at 14:30

## 2017-03-27 ENCOUNTER — APPOINTMENT (OUTPATIENT)
Dept: INTERVENTIONAL RADIOLOGY/VASCULAR | Age: 82
DRG: 871 | End: 2017-03-27
Payer: MEDICARE

## 2017-03-27 LAB
ANION GAP SERPL CALCULATED.3IONS-SCNC: 8 MMOL/L (ref 9–17)
BUN BLDV-MCNC: 4 MG/DL (ref 8–23)
BUN/CREAT BLD: ABNORMAL (ref 9–20)
CALCIUM SERPL-MCNC: 8.1 MG/DL (ref 8.6–10.4)
CHLORIDE BLD-SCNC: 103 MMOL/L (ref 98–107)
CO2: 22 MMOL/L (ref 20–31)
CREAT SERPL-MCNC: <0.4 MG/DL (ref 0.5–0.9)
CULTURE: NORMAL
CULTURE: NORMAL
GFR AFRICAN AMERICAN: ABNORMAL ML/MIN
GFR NON-AFRICAN AMERICAN: ABNORMAL ML/MIN
GFR SERPL CREATININE-BSD FRML MDRD: ABNORMAL ML/MIN/{1.73_M2}
GFR SERPL CREATININE-BSD FRML MDRD: ABNORMAL ML/MIN/{1.73_M2}
GLUCOSE BLD-MCNC: 101 MG/DL (ref 70–99)
HCT VFR BLD CALC: 35 % (ref 36–46)
HEMOGLOBIN: 10.9 G/DL (ref 12–16)
INR BLD: 1.9
Lab: NORMAL
MCH RBC QN AUTO: 32.6 PG (ref 26–34)
MCHC RBC AUTO-ENTMCNC: 31.2 G/DL (ref 31–37)
MCV RBC AUTO: 104.5 FL (ref 80–100)
PDW BLD-RTO: 14.8 % (ref 11.5–14.9)
PLATELET # BLD: 190 K/UL (ref 150–450)
PMV BLD AUTO: 8.9 FL (ref 6–12)
POTASSIUM SERPL-SCNC: 4.5 MMOL/L (ref 3.7–5.3)
PROTHROMBIN TIME: 20.4 SEC (ref 9.7–12)
RBC # BLD: 3.35 M/UL (ref 4–5.2)
SODIUM BLD-SCNC: 133 MMOL/L (ref 135–144)
SPECIMEN DESCRIPTION: NORMAL
SPECIMEN DESCRIPTION: NORMAL
STATUS: NORMAL
WBC # BLD: 14.9 K/UL (ref 3.5–11)

## 2017-03-27 PROCEDURE — 6370000000 HC RX 637 (ALT 250 FOR IP): Performed by: FAMILY MEDICINE

## 2017-03-27 PROCEDURE — 80048 BASIC METABOLIC PNL TOTAL CA: CPT

## 2017-03-27 PROCEDURE — 36415 COLL VENOUS BLD VENIPUNCTURE: CPT

## 2017-03-27 PROCEDURE — 2500000003 HC RX 250 WO HCPCS: Performed by: INTERNAL MEDICINE

## 2017-03-27 PROCEDURE — 2060000000 HC ICU INTERMEDIATE R&B

## 2017-03-27 PROCEDURE — 6360000002 HC RX W HCPCS: Performed by: INTERNAL MEDICINE

## 2017-03-27 PROCEDURE — 97530 THERAPEUTIC ACTIVITIES: CPT

## 2017-03-27 PROCEDURE — 85027 COMPLETE CBC AUTOMATED: CPT

## 2017-03-27 PROCEDURE — 85610 PROTHROMBIN TIME: CPT

## 2017-03-27 PROCEDURE — 99232 SBSQ HOSP IP/OBS MODERATE 35: CPT | Performed by: FAMILY MEDICINE

## 2017-03-27 PROCEDURE — 97110 THERAPEUTIC EXERCISES: CPT

## 2017-03-27 PROCEDURE — 49465 FLUORO EXAM OF G/COLON TUBE: CPT | Performed by: RADIOLOGY

## 2017-03-27 PROCEDURE — 99233 SBSQ HOSP IP/OBS HIGH 50: CPT | Performed by: INTERNAL MEDICINE

## 2017-03-27 PROCEDURE — 97535 SELF CARE MNGMENT TRAINING: CPT

## 2017-03-27 PROCEDURE — 6360000004 HC RX CONTRAST MEDICATION: Performed by: FAMILY MEDICINE

## 2017-03-27 PROCEDURE — 2580000003 HC RX 258: Performed by: FAMILY MEDICINE

## 2017-03-27 RX ORDER — LEVOFLOXACIN 5 MG/ML
500 INJECTION, SOLUTION INTRAVENOUS EVERY 24 HOURS
Status: DISCONTINUED | OUTPATIENT
Start: 2017-03-27 | End: 2017-03-28

## 2017-03-27 RX ADMIN — METRONIDAZOLE 500 MG: 500 INJECTION, SOLUTION INTRAVENOUS at 23:57

## 2017-03-27 RX ADMIN — IOTHALAMATE MEGLUMINE 50 ML: 600 INJECTION INTRAVASCULAR at 10:07

## 2017-03-27 RX ADMIN — DONEPEZIL HYDROCHLORIDE 10 MG: 10 TABLET, FILM COATED ORAL at 23:36

## 2017-03-27 RX ADMIN — Medication 10 ML: at 11:36

## 2017-03-27 RX ADMIN — POTASSIUM CHLORIDE 20 MEQ: 1.5 POWDER, FOR SOLUTION ORAL at 11:35

## 2017-03-27 RX ADMIN — METRONIDAZOLE 500 MG: 500 INJECTION, SOLUTION INTRAVENOUS at 06:19

## 2017-03-27 RX ADMIN — CIPROFLOXACIN 400 MG: 2 INJECTION, SOLUTION INTRAVENOUS at 03:10

## 2017-03-27 RX ADMIN — METRONIDAZOLE 500 MG: 500 INJECTION, SOLUTION INTRAVENOUS at 16:28

## 2017-03-27 RX ADMIN — ASPIRIN 81 MG: 81 TABLET, COATED ORAL at 11:34

## 2017-03-27 RX ADMIN — CYANOCOBALAMIN TAB 1000 MCG 1000 MCG: 1000 TAB at 11:35

## 2017-03-27 RX ADMIN — SIMVASTATIN 20 MG: 20 TABLET, FILM COATED ORAL at 23:36

## 2017-03-27 RX ADMIN — VITAMIN D, TAB 1000IU (100/BT) 2000 UNITS: 25 TAB at 11:34

## 2017-03-27 RX ADMIN — LEVOFLOXACIN 500 MG: 5 INJECTION, SOLUTION INTRAVENOUS at 15:38

## 2017-03-27 RX ADMIN — WARFARIN SODIUM 5 MG: 5 TABLET ORAL at 23:36

## 2017-03-27 RX ADMIN — METOPROLOL TARTRATE 25 MG: 25 TABLET ORAL at 11:34

## 2017-03-27 RX ADMIN — POTASSIUM CHLORIDE, DEXTROSE MONOHYDRATE AND SODIUM CHLORIDE: 300; 5; 450 INJECTION, SOLUTION INTRAVENOUS at 20:47

## 2017-03-27 ASSESSMENT — PAIN SCALES - GENERAL
PAINLEVEL_OUTOF10: 0
PAINLEVEL_OUTOF10: 0

## 2017-03-28 LAB
ANION GAP SERPL CALCULATED.3IONS-SCNC: 8 MMOL/L (ref 9–17)
BUN BLDV-MCNC: 5 MG/DL (ref 8–23)
BUN/CREAT BLD: ABNORMAL (ref 9–20)
CALCIUM SERPL-MCNC: 8 MG/DL (ref 8.6–10.4)
CHLORIDE BLD-SCNC: 103 MMOL/L (ref 98–107)
CO2: 24 MMOL/L (ref 20–31)
CREAT SERPL-MCNC: 0.4 MG/DL (ref 0.5–0.9)
GFR AFRICAN AMERICAN: >60 ML/MIN
GFR NON-AFRICAN AMERICAN: >60 ML/MIN
GFR SERPL CREATININE-BSD FRML MDRD: ABNORMAL ML/MIN/{1.73_M2}
GFR SERPL CREATININE-BSD FRML MDRD: ABNORMAL ML/MIN/{1.73_M2}
GLUCOSE BLD-MCNC: 103 MG/DL (ref 70–99)
HCT VFR BLD CALC: 28.8 % (ref 36–46)
HEMOGLOBIN: 9.3 G/DL (ref 12–16)
INR BLD: 2.6
MCH RBC QN AUTO: 32.5 PG (ref 26–34)
MCHC RBC AUTO-ENTMCNC: 32.3 G/DL (ref 31–37)
MCV RBC AUTO: 100.6 FL (ref 80–100)
PDW BLD-RTO: 14.4 % (ref 11.5–14.9)
PLATELET # BLD: 203 K/UL (ref 150–450)
PMV BLD AUTO: 9.5 FL (ref 6–12)
POTASSIUM SERPL-SCNC: 4.4 MMOL/L (ref 3.7–5.3)
PROTHROMBIN TIME: 28.1 SEC (ref 9.7–12)
RBC # BLD: 2.87 M/UL (ref 4–5.2)
SODIUM BLD-SCNC: 135 MMOL/L (ref 135–144)
WBC # BLD: 13.7 K/UL (ref 3.5–11)

## 2017-03-28 PROCEDURE — 6370000000 HC RX 637 (ALT 250 FOR IP): Performed by: FAMILY MEDICINE

## 2017-03-28 PROCEDURE — 92526 ORAL FUNCTION THERAPY: CPT

## 2017-03-28 PROCEDURE — 99232 SBSQ HOSP IP/OBS MODERATE 35: CPT | Performed by: INTERNAL MEDICINE

## 2017-03-28 PROCEDURE — 6370000000 HC RX 637 (ALT 250 FOR IP): Performed by: INTERNAL MEDICINE

## 2017-03-28 PROCEDURE — 2060000000 HC ICU INTERMEDIATE R&B

## 2017-03-28 PROCEDURE — 36415 COLL VENOUS BLD VENIPUNCTURE: CPT

## 2017-03-28 PROCEDURE — 2500000003 HC RX 250 WO HCPCS: Performed by: INTERNAL MEDICINE

## 2017-03-28 PROCEDURE — 99232 SBSQ HOSP IP/OBS MODERATE 35: CPT | Performed by: FAMILY MEDICINE

## 2017-03-28 PROCEDURE — 85027 COMPLETE CBC AUTOMATED: CPT

## 2017-03-28 PROCEDURE — 80048 BASIC METABOLIC PNL TOTAL CA: CPT

## 2017-03-28 PROCEDURE — 99233 SBSQ HOSP IP/OBS HIGH 50: CPT | Performed by: INTERNAL MEDICINE

## 2017-03-28 PROCEDURE — 85610 PROTHROMBIN TIME: CPT

## 2017-03-28 RX ORDER — LEVOFLOXACIN 500 MG/1
250 TABLET, FILM COATED ORAL DAILY
Status: DISCONTINUED | OUTPATIENT
Start: 2017-03-28 | End: 2017-03-30 | Stop reason: HOSPADM

## 2017-03-28 RX ORDER — METRONIDAZOLE 500 MG/1
500 TABLET ORAL EVERY 8 HOURS SCHEDULED
Status: DISCONTINUED | OUTPATIENT
Start: 2017-03-28 | End: 2017-03-30 | Stop reason: HOSPADM

## 2017-03-28 RX ADMIN — SIMVASTATIN 20 MG: 20 TABLET, FILM COATED ORAL at 21:53

## 2017-03-28 RX ADMIN — CYANOCOBALAMIN TAB 1000 MCG 1000 MCG: 1000 TAB at 07:38

## 2017-03-28 RX ADMIN — ASPIRIN 81 MG: 81 TABLET, COATED ORAL at 07:41

## 2017-03-28 RX ADMIN — POTASSIUM CHLORIDE 20 MEQ: 1.5 POWDER, FOR SOLUTION ORAL at 07:38

## 2017-03-28 RX ADMIN — DONEPEZIL HYDROCHLORIDE 10 MG: 10 TABLET, FILM COATED ORAL at 21:54

## 2017-03-28 RX ADMIN — METRONIDAZOLE 500 MG: 500 TABLET ORAL at 21:53

## 2017-03-28 RX ADMIN — METOPROLOL TARTRATE 25 MG: 25 TABLET ORAL at 07:41

## 2017-03-28 RX ADMIN — WARFARIN SODIUM 5 MG: 5 TABLET ORAL at 17:02

## 2017-03-28 RX ADMIN — POTASSIUM CHLORIDE, DEXTROSE MONOHYDRATE AND SODIUM CHLORIDE: 300; 5; 450 INJECTION, SOLUTION INTRAVENOUS at 20:11

## 2017-03-28 RX ADMIN — VITAMIN D, TAB 1000IU (100/BT) 2000 UNITS: 25 TAB at 07:41

## 2017-03-28 RX ADMIN — LEVOFLOXACIN 250 MG: 500 TABLET, FILM COATED ORAL at 15:18

## 2017-03-28 RX ADMIN — METRONIDAZOLE 500 MG: 500 INJECTION, SOLUTION INTRAVENOUS at 05:36

## 2017-03-28 RX ADMIN — METRONIDAZOLE 500 MG: 500 TABLET ORAL at 15:20

## 2017-03-28 ASSESSMENT — PAIN SCALES - GENERAL: PAINLEVEL_OUTOF10: 0

## 2017-03-29 LAB
ANION GAP SERPL CALCULATED.3IONS-SCNC: 7 MMOL/L (ref 9–17)
BUN BLDV-MCNC: 6 MG/DL (ref 8–23)
BUN/CREAT BLD: ABNORMAL (ref 9–20)
CALCIUM SERPL-MCNC: 8 MG/DL (ref 8.6–10.4)
CHLORIDE BLD-SCNC: 104 MMOL/L (ref 98–107)
CO2: 26 MMOL/L (ref 20–31)
CREAT SERPL-MCNC: 0.46 MG/DL (ref 0.5–0.9)
GFR AFRICAN AMERICAN: >60 ML/MIN
GFR NON-AFRICAN AMERICAN: >60 ML/MIN
GFR SERPL CREATININE-BSD FRML MDRD: ABNORMAL ML/MIN/{1.73_M2}
GFR SERPL CREATININE-BSD FRML MDRD: ABNORMAL ML/MIN/{1.73_M2}
GLUCOSE BLD-MCNC: 97 MG/DL (ref 70–99)
HCT VFR BLD CALC: 27.1 % (ref 36–46)
HEMOGLOBIN: 8.8 G/DL (ref 12–16)
INR BLD: 2.7
MCH RBC QN AUTO: 32.8 PG (ref 26–34)
MCHC RBC AUTO-ENTMCNC: 32.4 G/DL (ref 31–37)
MCV RBC AUTO: 101.1 FL (ref 80–100)
PDW BLD-RTO: 14.2 % (ref 11.5–14.9)
PLATELET # BLD: 231 K/UL (ref 150–450)
PMV BLD AUTO: 9.4 FL (ref 6–12)
POTASSIUM SERPL-SCNC: 4.3 MMOL/L (ref 3.7–5.3)
PROTHROMBIN TIME: 29.8 SEC (ref 9.7–12)
RBC # BLD: 2.68 M/UL (ref 4–5.2)
SODIUM BLD-SCNC: 137 MMOL/L (ref 135–144)
WBC # BLD: 13.9 K/UL (ref 3.5–11)

## 2017-03-29 PROCEDURE — 99233 SBSQ HOSP IP/OBS HIGH 50: CPT | Performed by: INTERNAL MEDICINE

## 2017-03-29 PROCEDURE — 6370000000 HC RX 637 (ALT 250 FOR IP): Performed by: INTERNAL MEDICINE

## 2017-03-29 PROCEDURE — 85610 PROTHROMBIN TIME: CPT

## 2017-03-29 PROCEDURE — 99232 SBSQ HOSP IP/OBS MODERATE 35: CPT | Performed by: INTERNAL MEDICINE

## 2017-03-29 PROCEDURE — 97535 SELF CARE MNGMENT TRAINING: CPT

## 2017-03-29 PROCEDURE — 36415 COLL VENOUS BLD VENIPUNCTURE: CPT

## 2017-03-29 PROCEDURE — 92526 ORAL FUNCTION THERAPY: CPT

## 2017-03-29 PROCEDURE — 97110 THERAPEUTIC EXERCISES: CPT

## 2017-03-29 PROCEDURE — 99232 SBSQ HOSP IP/OBS MODERATE 35: CPT | Performed by: FAMILY MEDICINE

## 2017-03-29 PROCEDURE — 2060000000 HC ICU INTERMEDIATE R&B

## 2017-03-29 PROCEDURE — 85027 COMPLETE CBC AUTOMATED: CPT

## 2017-03-29 PROCEDURE — 6370000000 HC RX 637 (ALT 250 FOR IP): Performed by: FAMILY MEDICINE

## 2017-03-29 PROCEDURE — 80048 BASIC METABOLIC PNL TOTAL CA: CPT

## 2017-03-29 PROCEDURE — 2500000003 HC RX 250 WO HCPCS: Performed by: INTERNAL MEDICINE

## 2017-03-29 RX ADMIN — POTASSIUM CHLORIDE, DEXTROSE MONOHYDRATE AND SODIUM CHLORIDE: 300; 5; 450 INJECTION, SOLUTION INTRAVENOUS at 19:20

## 2017-03-29 RX ADMIN — METRONIDAZOLE 500 MG: 500 TABLET ORAL at 05:55

## 2017-03-29 RX ADMIN — DONEPEZIL HYDROCHLORIDE 10 MG: 10 TABLET, FILM COATED ORAL at 22:09

## 2017-03-29 RX ADMIN — METRONIDAZOLE 500 MG: 500 TABLET ORAL at 15:22

## 2017-03-29 RX ADMIN — ASPIRIN 81 MG: 81 TABLET, COATED ORAL at 11:09

## 2017-03-29 RX ADMIN — VITAMIN D, TAB 1000IU (100/BT) 2000 UNITS: 25 TAB at 11:10

## 2017-03-29 RX ADMIN — METOPROLOL TARTRATE 25 MG: 25 TABLET ORAL at 15:22

## 2017-03-29 RX ADMIN — WARFARIN SODIUM 5 MG: 5 TABLET ORAL at 22:08

## 2017-03-29 RX ADMIN — POTASSIUM CHLORIDE 20 MEQ: 1.5 POWDER, FOR SOLUTION ORAL at 11:08

## 2017-03-29 RX ADMIN — LEVOFLOXACIN 250 MG: 500 TABLET, FILM COATED ORAL at 11:10

## 2017-03-29 RX ADMIN — METRONIDAZOLE 500 MG: 500 TABLET ORAL at 22:11

## 2017-03-29 RX ADMIN — CYANOCOBALAMIN TAB 1000 MCG 1000 MCG: 1000 TAB at 11:10

## 2017-03-29 RX ADMIN — SIMVASTATIN 20 MG: 20 TABLET, FILM COATED ORAL at 22:11

## 2017-03-29 ASSESSMENT — PAIN SCALES - GENERAL: PAINLEVEL_OUTOF10: 0

## 2017-03-30 VITALS
WEIGHT: 134.48 LBS | OXYGEN SATURATION: 98 % | TEMPERATURE: 97.6 F | SYSTOLIC BLOOD PRESSURE: 101 MMHG | RESPIRATION RATE: 16 BRPM | DIASTOLIC BLOOD PRESSURE: 55 MMHG | HEART RATE: 64 BPM | BODY MASS INDEX: 24.75 KG/M2 | HEIGHT: 62 IN

## 2017-03-30 LAB
ANION GAP SERPL CALCULATED.3IONS-SCNC: 6 MMOL/L (ref 9–17)
BUN BLDV-MCNC: 8 MG/DL (ref 8–23)
BUN/CREAT BLD: ABNORMAL (ref 9–20)
CALCIUM SERPL-MCNC: 7.9 MG/DL (ref 8.6–10.4)
CHLORIDE BLD-SCNC: 104 MMOL/L (ref 98–107)
CO2: 24 MMOL/L (ref 20–31)
CREAT SERPL-MCNC: 0.4 MG/DL (ref 0.5–0.9)
GFR AFRICAN AMERICAN: >60 ML/MIN
GFR NON-AFRICAN AMERICAN: >60 ML/MIN
GFR SERPL CREATININE-BSD FRML MDRD: ABNORMAL ML/MIN/{1.73_M2}
GFR SERPL CREATININE-BSD FRML MDRD: ABNORMAL ML/MIN/{1.73_M2}
GLUCOSE BLD-MCNC: 131 MG/DL (ref 70–99)
HCT VFR BLD CALC: 30.6 % (ref 36–46)
HEMOGLOBIN: 9.9 G/DL (ref 12–16)
INR BLD: 2.9
MCH RBC QN AUTO: 32.8 PG (ref 26–34)
MCHC RBC AUTO-ENTMCNC: 32.4 G/DL (ref 31–37)
MCV RBC AUTO: 101.3 FL (ref 80–100)
PDW BLD-RTO: 14.2 % (ref 11.5–14.9)
PLATELET # BLD: 217 K/UL (ref 150–450)
PMV BLD AUTO: 9.5 FL (ref 6–12)
POTASSIUM SERPL-SCNC: 4.5 MMOL/L (ref 3.7–5.3)
PROTHROMBIN TIME: 31.6 SEC (ref 9.7–12)
RBC # BLD: 3.02 M/UL (ref 4–5.2)
SODIUM BLD-SCNC: 134 MMOL/L (ref 135–144)
WBC # BLD: 16.3 K/UL (ref 3.5–11)

## 2017-03-30 PROCEDURE — 80048 BASIC METABOLIC PNL TOTAL CA: CPT

## 2017-03-30 PROCEDURE — 99239 HOSP IP/OBS DSCHRG MGMT >30: CPT | Performed by: FAMILY MEDICINE

## 2017-03-30 PROCEDURE — 36415 COLL VENOUS BLD VENIPUNCTURE: CPT

## 2017-03-30 PROCEDURE — 85610 PROTHROMBIN TIME: CPT

## 2017-03-30 PROCEDURE — 6370000000 HC RX 637 (ALT 250 FOR IP): Performed by: INTERNAL MEDICINE

## 2017-03-30 PROCEDURE — 6370000000 HC RX 637 (ALT 250 FOR IP): Performed by: FAMILY MEDICINE

## 2017-03-30 PROCEDURE — 85027 COMPLETE CBC AUTOMATED: CPT

## 2017-03-30 PROCEDURE — 97110 THERAPEUTIC EXERCISES: CPT

## 2017-03-30 RX ORDER — METRONIDAZOLE 500 MG/1
500 TABLET ORAL EVERY 8 HOURS SCHEDULED
Qty: 6 TABLET | Refills: 0 | Status: SHIPPED | OUTPATIENT
Start: 2017-03-30 | End: 2017-04-01

## 2017-03-30 RX ORDER — LEVOFLOXACIN 250 MG/1
250 TABLET ORAL DAILY
Qty: 2 TABLET | Refills: 0 | Status: SHIPPED | OUTPATIENT
Start: 2017-03-30 | End: 2017-04-01

## 2017-03-30 RX ADMIN — POTASSIUM CHLORIDE 20 MEQ: 1.5 POWDER, FOR SOLUTION ORAL at 09:50

## 2017-03-30 RX ADMIN — METRONIDAZOLE 500 MG: 500 TABLET ORAL at 05:13

## 2017-03-30 RX ADMIN — METRONIDAZOLE 500 MG: 500 TABLET ORAL at 16:33

## 2017-03-30 RX ADMIN — VITAMIN D, TAB 1000IU (100/BT) 2000 UNITS: 25 TAB at 09:50

## 2017-03-30 RX ADMIN — CYANOCOBALAMIN TAB 1000 MCG 1000 MCG: 1000 TAB at 09:50

## 2017-03-30 RX ADMIN — ASPIRIN 81 MG: 81 TABLET, COATED ORAL at 09:50

## 2017-03-30 RX ADMIN — LEVOFLOXACIN 250 MG: 500 TABLET, FILM COATED ORAL at 10:30

## 2017-03-30 ASSESSMENT — PAIN SCALES - GENERAL: PAINLEVEL_OUTOF10: 0

## 2017-03-31 LAB
EKG ATRIAL RATE: 65 BPM
EKG Q-T INTERVAL: 370 MS
EKG QRS DURATION: 86 MS
EKG QTC CALCULATION (BAZETT): 460 MS
EKG R AXIS: 2 DEGREES
EKG T AXIS: -38 DEGREES
EKG VENTRICULAR RATE: 93 BPM

## 2017-04-27 ENCOUNTER — CARE COORDINATION (OUTPATIENT)
Dept: CARE COORDINATION | Age: 82
End: 2017-04-27

## 2017-06-19 ENCOUNTER — TELEPHONE (OUTPATIENT)
Dept: GASTROENTEROLOGY | Age: 82
End: 2017-06-19

## 2017-06-20 RX ORDER — LEVOTHYROXINE SODIUM 0.03 MG/1
25 TABLET ORAL DAILY
COMMUNITY

## 2017-06-23 DIAGNOSIS — Z93.1 PEG (PERCUTANEOUS ENDOSCOPIC GASTROSTOMY) STATUS (HCC): Primary | ICD-10-CM

## 2017-06-23 DIAGNOSIS — R13.10 DYSPHAGIA, UNSPECIFIED TYPE: ICD-10-CM

## 2017-07-05 ENCOUNTER — HOSPITAL ENCOUNTER (OUTPATIENT)
Age: 82
Setting detail: OUTPATIENT SURGERY
Discharge: NURSING, REHAB OR LONG-TERM CARE | End: 2017-07-05
Attending: INTERNAL MEDICINE | Admitting: INTERNAL MEDICINE
Payer: MEDICARE

## 2017-07-05 VITALS
OXYGEN SATURATION: 100 % | HEIGHT: 62 IN | SYSTOLIC BLOOD PRESSURE: 114 MMHG | TEMPERATURE: 97.5 F | BODY MASS INDEX: 23.92 KG/M2 | WEIGHT: 130 LBS | DIASTOLIC BLOOD PRESSURE: 50 MMHG | RESPIRATION RATE: 18 BRPM | HEART RATE: 78 BPM

## 2017-07-05 PROCEDURE — 7100000010 HC PHASE II RECOVERY - FIRST 15 MIN: Performed by: INTERNAL MEDICINE

## 2017-07-05 PROCEDURE — 6360000002 HC RX W HCPCS: Performed by: INTERNAL MEDICINE

## 2017-07-05 PROCEDURE — 7100000011 HC PHASE II RECOVERY - ADDTL 15 MIN: Performed by: INTERNAL MEDICINE

## 2017-07-05 PROCEDURE — 99152 MOD SED SAME PHYS/QHP 5/>YRS: CPT | Performed by: INTERNAL MEDICINE

## 2017-07-05 PROCEDURE — 2580000003 HC RX 258: Performed by: INTERNAL MEDICINE

## 2017-07-05 PROCEDURE — 6370000000 HC RX 637 (ALT 250 FOR IP): Performed by: INTERNAL MEDICINE

## 2017-07-05 PROCEDURE — C1889 IMPLANT/INSERT DEVICE, NOC: HCPCS | Performed by: INTERNAL MEDICINE

## 2017-07-05 PROCEDURE — 99153 MOD SED SAME PHYS/QHP EA: CPT | Performed by: INTERNAL MEDICINE

## 2017-07-05 PROCEDURE — 3609018400 HC EGD ESOPHAGOGASTRODUODENOSCOPY TUBE INSERTION: Performed by: INTERNAL MEDICINE

## 2017-07-05 RX ORDER — MIDAZOLAM HYDROCHLORIDE 1 MG/ML
INJECTION INTRAMUSCULAR; INTRAVENOUS PRN
Status: DISCONTINUED | OUTPATIENT
Start: 2017-07-05 | End: 2017-07-05 | Stop reason: HOSPADM

## 2017-07-05 RX ORDER — SODIUM CHLORIDE 9 MG/ML
INJECTION, SOLUTION INTRAVENOUS CONTINUOUS
Status: DISCONTINUED | OUTPATIENT
Start: 2017-07-05 | End: 2017-07-05 | Stop reason: HOSPADM

## 2017-07-05 RX ADMIN — SODIUM CHLORIDE: 9 INJECTION, SOLUTION INTRAVENOUS at 08:30

## 2017-07-05 ASSESSMENT — PAIN - FUNCTIONAL ASSESSMENT: PAIN_FUNCTIONAL_ASSESSMENT: 0-10

## 2017-07-05 ASSESSMENT — PAIN SCALES - GENERAL
PAINLEVEL_OUTOF10: 0
PAINLEVEL_OUTOF10: 0

## 2017-08-04 ENCOUNTER — OFFICE VISIT (OUTPATIENT)
Dept: GASTROENTEROLOGY | Age: 82
End: 2017-08-04
Payer: MEDICARE

## 2017-08-04 VITALS
DIASTOLIC BLOOD PRESSURE: 65 MMHG | OXYGEN SATURATION: 98 % | SYSTOLIC BLOOD PRESSURE: 118 MMHG | TEMPERATURE: 98.1 F | HEART RATE: 54 BPM | HEIGHT: 62 IN | RESPIRATION RATE: 14 BRPM

## 2017-08-04 DIAGNOSIS — Z93.1 PEG (PERCUTANEOUS ENDOSCOPIC GASTROSTOMY) STATUS (HCC): Primary | ICD-10-CM

## 2017-08-04 PROCEDURE — 4040F PNEUMOC VAC/ADMIN/RCVD: CPT | Performed by: INTERNAL MEDICINE

## 2017-08-04 PROCEDURE — G8427 DOCREV CUR MEDS BY ELIG CLIN: HCPCS | Performed by: INTERNAL MEDICINE

## 2017-08-04 PROCEDURE — 1090F PRES/ABSN URINE INCON ASSESS: CPT | Performed by: INTERNAL MEDICINE

## 2017-08-04 PROCEDURE — G8400 PT W/DXA NO RESULTS DOC: HCPCS | Performed by: INTERNAL MEDICINE

## 2017-08-04 PROCEDURE — 1123F ACP DISCUSS/DSCN MKR DOCD: CPT | Performed by: INTERNAL MEDICINE

## 2017-08-04 PROCEDURE — G8420 CALC BMI NORM PARAMETERS: HCPCS | Performed by: INTERNAL MEDICINE

## 2017-08-04 PROCEDURE — 99213 OFFICE O/P EST LOW 20 MIN: CPT | Performed by: INTERNAL MEDICINE

## 2017-08-04 PROCEDURE — 1036F TOBACCO NON-USER: CPT | Performed by: INTERNAL MEDICINE

## 2017-08-04 ASSESSMENT — ENCOUNTER SYMPTOMS
EYES NEGATIVE: 1
TROUBLE SWALLOWING: 1
NAUSEA: 0
VOMITING: 0
BLOOD IN STOOL: 0
ANAL BLEEDING: 0
RECTAL PAIN: 0
DIARRHEA: 0
ABDOMINAL PAIN: 0
ALLERGIC/IMMUNOLOGIC NEGATIVE: 1
GASTROINTESTINAL NEGATIVE: 1
CONSTIPATION: 0
CHOKING: 1
ABDOMINAL DISTENTION: 0

## 2017-11-20 ENCOUNTER — APPOINTMENT (OUTPATIENT)
Dept: GENERAL RADIOLOGY | Age: 82
DRG: 178 | End: 2017-11-20
Payer: MEDICARE

## 2017-11-20 ENCOUNTER — HOSPITAL ENCOUNTER (INPATIENT)
Age: 82
LOS: 2 days | Discharge: NURSING, REHAB OR LONG-TERM CARE | DRG: 178 | End: 2017-11-22
Attending: EMERGENCY MEDICINE | Admitting: INTERNAL MEDICINE
Payer: MEDICARE

## 2017-11-20 DIAGNOSIS — J18.9 PNEUMONIA DUE TO ORGANISM: Primary | ICD-10-CM

## 2017-11-20 DIAGNOSIS — D68.9 COAGULOPATHY (HCC): ICD-10-CM

## 2017-11-20 DIAGNOSIS — I48.91 ATRIAL FIBRILLATION, UNSPECIFIED TYPE (HCC): ICD-10-CM

## 2017-11-20 PROBLEM — E03.9 HYPOTHYROID: Status: ACTIVE | Noted: 2017-11-20

## 2017-11-20 LAB
-: ABNORMAL
ABSOLUTE EOS #: 0.5 K/UL (ref 0–0.4)
ABSOLUTE IMMATURE GRANULOCYTE: ABNORMAL K/UL (ref 0–0.3)
ABSOLUTE LYMPH #: 3.47 K/UL (ref 1–4.8)
ABSOLUTE MONO #: 1.82 K/UL (ref 0.1–1.3)
AMORPHOUS: ABNORMAL
ANION GAP SERPL CALCULATED.3IONS-SCNC: 6 MMOL/L (ref 9–17)
BACTERIA: ABNORMAL
BASOPHILS # BLD: 0 %
BASOPHILS ABSOLUTE: 0 K/UL (ref 0–0.2)
BILIRUBIN URINE: NEGATIVE
BNP INTERPRETATION: ABNORMAL
BUN BLDV-MCNC: 32 MG/DL (ref 8–23)
BUN/CREAT BLD: ABNORMAL (ref 9–20)
CALCIUM SERPL-MCNC: 8.4 MG/DL (ref 8.6–10.4)
CASTS UA: ABNORMAL /LPF
CHLORIDE BLD-SCNC: 105 MMOL/L (ref 98–107)
CO2: 28 MMOL/L (ref 20–31)
COLOR: YELLOW
COMMENT UA: ABNORMAL
CREAT SERPL-MCNC: <0.4 MG/DL (ref 0.5–0.9)
CRYSTALS, UA: ABNORMAL /HPF
DIFFERENTIAL TYPE: ABNORMAL
EKG ATRIAL RATE: 63 BPM
EKG Q-T INTERVAL: 386 MS
EKG QRS DURATION: 80 MS
EKG QTC CALCULATION (BAZETT): 436 MS
EKG R AXIS: 47 DEGREES
EKG T AXIS: 19 DEGREES
EKG VENTRICULAR RATE: 77 BPM
EOSINOPHILS RELATIVE PERCENT: 3 %
EPITHELIAL CELLS UA: ABNORMAL /HPF
GFR AFRICAN AMERICAN: ABNORMAL ML/MIN
GFR NON-AFRICAN AMERICAN: ABNORMAL ML/MIN
GFR SERPL CREATININE-BSD FRML MDRD: ABNORMAL ML/MIN/{1.73_M2}
GFR SERPL CREATININE-BSD FRML MDRD: ABNORMAL ML/MIN/{1.73_M2}
GLUCOSE BLD-MCNC: 133 MG/DL (ref 70–99)
GLUCOSE URINE: NEGATIVE
HCT VFR BLD CALC: 30.5 % (ref 36–46)
HEMOGLOBIN: 10.2 G/DL (ref 12–16)
IMMATURE GRANULOCYTES: ABNORMAL %
INR BLD: 6.6
KETONES, URINE: NEGATIVE
LACTIC ACID, WHOLE BLOOD: NORMAL MMOL/L (ref 0.7–2.1)
LACTIC ACID: 1.1 MMOL/L (ref 0.5–2.2)
LACTIC ACID: 1.3 MMOL/L (ref 0.5–2.2)
LEUKOCYTE ESTERASE, URINE: ABNORMAL
LYMPHOCYTES # BLD: 21 %
MCH RBC QN AUTO: 34.5 PG (ref 26–34)
MCHC RBC AUTO-ENTMCNC: 33.4 G/DL (ref 31–37)
MCV RBC AUTO: 103.3 FL (ref 80–100)
MONOCYTES # BLD: 11 %
MORPHOLOGY: ABNORMAL
MUCUS: ABNORMAL
NITRITE, URINE: NEGATIVE
OTHER OBSERVATIONS UA: ABNORMAL
PARTIAL THROMBOPLASTIN TIME: 60.6 SEC (ref 23–31)
PDW BLD-RTO: 14.9 % (ref 11.5–14.9)
PH UA: 7.5 (ref 5–8)
PLATELET # BLD: 293 K/UL (ref 150–450)
PLATELET ESTIMATE: ABNORMAL
PMV BLD AUTO: 9.7 FL (ref 6–12)
POTASSIUM SERPL-SCNC: 4.5 MMOL/L (ref 3.7–5.3)
PRO-BNP: 2310 PG/ML
PROCALCITONIN: 0.08 NG/ML
PROTEIN UA: ABNORMAL
PROTHROMBIN TIME: 79.3 SEC (ref 9.7–12)
RBC # BLD: 2.95 M/UL (ref 4–5.2)
RBC # BLD: ABNORMAL 10*6/UL
RBC UA: ABNORMAL /HPF
RENAL EPITHELIAL, UA: ABNORMAL /HPF
SEG NEUTROPHILS: 65 %
SEGMENTED NEUTROPHILS ABSOLUTE COUNT: 10.71 K/UL (ref 1.3–9.1)
SODIUM BLD-SCNC: 139 MMOL/L (ref 135–144)
SPECIFIC GRAVITY UA: 1.02 (ref 1–1.03)
TRICHOMONAS: ABNORMAL
TROPONIN INTERP: NORMAL
TROPONIN T: <0.03 NG/ML
TURBIDITY: ABNORMAL
URINE HGB: NEGATIVE
UROBILINOGEN, URINE: NORMAL
WBC # BLD: 16.5 K/UL (ref 3.5–11)
WBC # BLD: ABNORMAL 10*3/UL
WBC UA: ABNORMAL /HPF
YEAST: ABNORMAL

## 2017-11-20 PROCEDURE — 87205 SMEAR GRAM STAIN: CPT

## 2017-11-20 PROCEDURE — 83880 ASSAY OF NATRIURETIC PEPTIDE: CPT

## 2017-11-20 PROCEDURE — 81001 URINALYSIS AUTO W/SCOPE: CPT

## 2017-11-20 PROCEDURE — 36415 COLL VENOUS BLD VENIPUNCTURE: CPT

## 2017-11-20 PROCEDURE — 2060000000 HC ICU INTERMEDIATE R&B

## 2017-11-20 PROCEDURE — 6370000000 HC RX 637 (ALT 250 FOR IP): Performed by: HOSPITALIST

## 2017-11-20 PROCEDURE — 2580000003 HC RX 258: Performed by: EMERGENCY MEDICINE

## 2017-11-20 PROCEDURE — 71010 XR CHEST LIMITED: CPT

## 2017-11-20 PROCEDURE — 87040 BLOOD CULTURE FOR BACTERIA: CPT

## 2017-11-20 PROCEDURE — 85025 COMPLETE CBC W/AUTO DIFF WBC: CPT

## 2017-11-20 PROCEDURE — 2580000003 HC RX 258: Performed by: HOSPITALIST

## 2017-11-20 PROCEDURE — 99285 EMERGENCY DEPT VISIT HI MDM: CPT

## 2017-11-20 PROCEDURE — 83605 ASSAY OF LACTIC ACID: CPT

## 2017-11-20 PROCEDURE — 6370000000 HC RX 637 (ALT 250 FOR IP): Performed by: RADIOLOGY

## 2017-11-20 PROCEDURE — 73060 X-RAY EXAM OF HUMERUS: CPT

## 2017-11-20 PROCEDURE — 6360000002 HC RX W HCPCS: Performed by: EMERGENCY MEDICINE

## 2017-11-20 PROCEDURE — 93005 ELECTROCARDIOGRAM TRACING: CPT

## 2017-11-20 PROCEDURE — 6360000002 HC RX W HCPCS: Performed by: HOSPITALIST

## 2017-11-20 PROCEDURE — 94761 N-INVAS EAR/PLS OXIMETRY MLT: CPT

## 2017-11-20 PROCEDURE — 87149 DNA/RNA DIRECT PROBE: CPT

## 2017-11-20 PROCEDURE — 94640 AIRWAY INHALATION TREATMENT: CPT

## 2017-11-20 PROCEDURE — 84145 PROCALCITONIN (PCT): CPT

## 2017-11-20 PROCEDURE — 84484 ASSAY OF TROPONIN QUANT: CPT

## 2017-11-20 PROCEDURE — 85730 THROMBOPLASTIN TIME PARTIAL: CPT

## 2017-11-20 PROCEDURE — 96365 THER/PROPH/DIAG IV INF INIT: CPT

## 2017-11-20 PROCEDURE — 80048 BASIC METABOLIC PNL TOTAL CA: CPT

## 2017-11-20 PROCEDURE — 85610 PROTHROMBIN TIME: CPT

## 2017-11-20 RX ORDER — FAMOTIDINE 20 MG/1
20 TABLET, FILM COATED ORAL 2 TIMES DAILY
Status: DISCONTINUED | OUTPATIENT
Start: 2017-11-20 | End: 2017-11-23 | Stop reason: HOSPADM

## 2017-11-20 RX ORDER — ACETAMINOPHEN 325 MG/1
650 TABLET ORAL EVERY 4 HOURS PRN
Status: DISCONTINUED | OUTPATIENT
Start: 2017-11-20 | End: 2017-11-20 | Stop reason: SDUPTHER

## 2017-11-20 RX ORDER — LEVOTHYROXINE SODIUM 0.03 MG/1
25 TABLET ORAL DAILY
Status: DISCONTINUED | OUTPATIENT
Start: 2017-11-20 | End: 2017-11-23 | Stop reason: HOSPADM

## 2017-11-20 RX ORDER — SIMVASTATIN 20 MG
20 TABLET ORAL NIGHTLY
Status: DISCONTINUED | OUTPATIENT
Start: 2017-11-20 | End: 2017-11-23 | Stop reason: HOSPADM

## 2017-11-20 RX ORDER — HYDROCODONE BITARTRATE AND ACETAMINOPHEN 5; 325 MG/1; MG/1
1 TABLET ORAL EVERY 6 HOURS PRN
Status: DISCONTINUED | OUTPATIENT
Start: 2017-11-20 | End: 2017-11-23 | Stop reason: HOSPADM

## 2017-11-20 RX ORDER — SODIUM CHLORIDE 0.9 % (FLUSH) 0.9 %
10 SYRINGE (ML) INJECTION EVERY 12 HOURS SCHEDULED
Status: DISCONTINUED | OUTPATIENT
Start: 2017-11-20 | End: 2017-11-23 | Stop reason: HOSPADM

## 2017-11-20 RX ORDER — SODIUM CHLORIDE 9 MG/ML
INJECTION, SOLUTION INTRAVENOUS CONTINUOUS
Status: DISCONTINUED | OUTPATIENT
Start: 2017-11-20 | End: 2017-11-20

## 2017-11-20 RX ORDER — ONDANSETRON 2 MG/ML
4 INJECTION INTRAMUSCULAR; INTRAVENOUS EVERY 6 HOURS PRN
Status: DISCONTINUED | OUTPATIENT
Start: 2017-11-20 | End: 2017-11-23 | Stop reason: HOSPADM

## 2017-11-20 RX ORDER — HYDROCODONE BITARTRATE AND ACETAMINOPHEN 5; 325 MG/1; MG/1
2 TABLET ORAL EVERY 6 HOURS PRN
Status: DISCONTINUED | OUTPATIENT
Start: 2017-11-20 | End: 2017-11-23 | Stop reason: HOSPADM

## 2017-11-20 RX ORDER — BISACODYL 10 MG
10 SUPPOSITORY, RECTAL RECTAL DAILY PRN
Status: DISCONTINUED | OUTPATIENT
Start: 2017-11-20 | End: 2017-11-23 | Stop reason: HOSPADM

## 2017-11-20 RX ORDER — SODIUM CHLORIDE 0.9 % (FLUSH) 0.9 %
10 SYRINGE (ML) INJECTION PRN
Status: DISCONTINUED | OUTPATIENT
Start: 2017-11-20 | End: 2017-11-23 | Stop reason: HOSPADM

## 2017-11-20 RX ORDER — DONEPEZIL HYDROCHLORIDE 10 MG/1
10 TABLET, FILM COATED ORAL NIGHTLY
Status: DISCONTINUED | OUTPATIENT
Start: 2017-11-20 | End: 2017-11-23 | Stop reason: HOSPADM

## 2017-11-20 RX ORDER — ACETAMINOPHEN 325 MG/1
650 TABLET ORAL EVERY 4 HOURS PRN
Status: DISCONTINUED | OUTPATIENT
Start: 2017-11-20 | End: 2017-11-23 | Stop reason: HOSPADM

## 2017-11-20 RX ORDER — ASPIRIN 81 MG/1
81 TABLET ORAL DAILY
Status: DISCONTINUED | OUTPATIENT
Start: 2017-11-20 | End: 2017-11-21 | Stop reason: CLARIF

## 2017-11-20 RX ORDER — NICOTINE 21 MG/24HR
1 PATCH, TRANSDERMAL 24 HOURS TRANSDERMAL DAILY PRN
Status: DISCONTINUED | OUTPATIENT
Start: 2017-11-20 | End: 2017-11-23 | Stop reason: HOSPADM

## 2017-11-20 RX ORDER — DOCUSATE SODIUM 100 MG/1
100 CAPSULE, LIQUID FILLED ORAL 2 TIMES DAILY
Status: DISCONTINUED | OUTPATIENT
Start: 2017-11-20 | End: 2017-11-20 | Stop reason: CLARIF

## 2017-11-20 RX ORDER — IPRATROPIUM BROMIDE AND ALBUTEROL SULFATE 2.5; .5 MG/3ML; MG/3ML
1 SOLUTION RESPIRATORY (INHALATION)
Status: DISCONTINUED | OUTPATIENT
Start: 2017-11-20 | End: 2017-11-23 | Stop reason: HOSPADM

## 2017-11-20 RX ORDER — ALBUTEROL SULFATE 2.5 MG/3ML
2.5 SOLUTION RESPIRATORY (INHALATION)
Status: DISCONTINUED | OUTPATIENT
Start: 2017-11-20 | End: 2017-11-23 | Stop reason: HOSPADM

## 2017-11-20 RX ADMIN — MICONAZOLE NITRATE: 20 CREAM TOPICAL at 22:27

## 2017-11-20 RX ADMIN — DONEPEZIL HYDROCHLORIDE 10 MG: 10 TABLET, FILM COATED ORAL at 22:27

## 2017-11-20 RX ADMIN — IPRATROPIUM BROMIDE AND ALBUTEROL SULFATE 1 AMPULE: .5; 3 SOLUTION RESPIRATORY (INHALATION) at 15:13

## 2017-11-20 RX ADMIN — MICONAZOLE NITRATE: 20 CREAM TOPICAL at 15:11

## 2017-11-20 RX ADMIN — VANCOMYCIN HYDROCHLORIDE 1000 MG: 10 INJECTION, POWDER, LYOPHILIZED, FOR SOLUTION INTRAVENOUS at 23:35

## 2017-11-20 RX ADMIN — SIMVASTATIN 20 MG: 20 TABLET, FILM COATED ORAL at 22:47

## 2017-11-20 RX ADMIN — PIPERACILLIN SODIUM,TAZOBACTAM SODIUM 3.38 G: 3; .375 INJECTION, POWDER, FOR SOLUTION INTRAVENOUS at 18:34

## 2017-11-20 RX ADMIN — DOCUSATE SODIUM 100 MG: 50 LIQUID ORAL at 23:35

## 2017-11-20 RX ADMIN — VANCOMYCIN HYDROCHLORIDE 1000 MG: 1 INJECTION, POWDER, LYOPHILIZED, FOR SOLUTION INTRAVENOUS at 12:00

## 2017-11-20 RX ADMIN — FAMOTIDINE 20 MG: 20 TABLET, FILM COATED ORAL at 22:27

## 2017-11-20 RX ADMIN — IPRATROPIUM BROMIDE AND ALBUTEROL SULFATE 1 AMPULE: .5; 3 SOLUTION RESPIRATORY (INHALATION) at 21:15

## 2017-11-20 RX ADMIN — PIPERACILLIN SODIUM AND TAZOBACTAM SODIUM 3.38 G: 3; .375 INJECTION, POWDER, LYOPHILIZED, FOR SOLUTION INTRAVENOUS at 11:19

## 2017-11-20 ASSESSMENT — ENCOUNTER SYMPTOMS
VOMITING: 0
BLURRED VISION: 0
SPUTUM PRODUCTION: 0
COUGH: 0
ABDOMINAL PAIN: 0
SHORTNESS OF BREATH: 0
DOUBLE VISION: 0
NAUSEA: 0

## 2017-11-20 ASSESSMENT — PAIN SCALES - GENERAL
PAINLEVEL_OUTOF10: 0
PAINLEVEL_OUTOF10: 0
PAINLEVEL_OUTOF10: 9

## 2017-11-20 NOTE — ED NOTES
Pt presents from [facility]. Pt states she has no complaints and wants to go home. Rodrigues  states they were toned out for shortness of breath and on arrival found pt sp02 to be 88%. Pt with nasal cannula in place with oxygen turned off. Squad turned oxygen and o2 saturation immediately went to 98%. Pt on 2liters of 02 normally. POA called with request to transport.       Mont Goldberg, RN  11/20/17 7863

## 2017-11-20 NOTE — FLOWSHEET NOTE
11/20/17 1415   Encounter Summary   [facility] [address] Completed   Spiritual/Faith   Intervention Contacted support as requested per patient/family request   Who?  [facility] [address]   Why? spiritual support   At Request Of patient

## 2017-11-20 NOTE — ED PROVIDER NOTES
[address] [facility]  eMERGENCY dEPARTMENT eNCOUnter    Pt Name: Mariana Cox  MRN: [id number]  [place] 1935  Date of evaluation: 11/20/17  CHIEF COMPLAINT       Chief Complaint   Patient presents with    Shortness of Breath     HISTORY OF PRESENT ILLNESS   HPI     72-year-old female with past medical history as listed below presents to the ER from an ECF for shortness of breath. Per EMS report, patient was noted to be hypoxic at 88%. Patient is to be on oxygen, 2 L at all times and was noted not to have oxygen turned on at that time. O2 was turned back on and patient oxygen saturation jones to 98%. Patient denies all complaints at this time. REVIEW OF SYSTEMS     Review of Systems   All other systems reviewed and are negative.     PAST MEDICAL HISTORY     Past Medical History:   Diagnosis Date    Asthma     Cancer [facility])     breast left    Cerebral artery occlusion with cerebral infarction ([facility]     Dementia     Depression     Essential hypertension 11/3/2016    Gastric bypass status for obesity     Hyperlipidemia     Hypothyroid 11/20/2017    Lymphadenopathy      SURGICAL HISTORY       Past Surgical History:   Procedure Laterality Date    NM EGD PERCUTANEOUS PLACEMENT GASTROSTOMY TUBE N/A 3/23/2017    PEG TUBE INSERTION performed by Belle Sandoval MD at [address] [facility]      UPPER GASTROINTESTINAL ENDOSCOPY  03/23/2017     disrupted gastric bypass surgery, # 200 G tube inserted    UPPER GASTROINTESTINAL ENDOSCOPY  07/05/2017    DANIEL tube exchange    UPPER GASTROINTESTINAL ENDOSCOPY N/A 7/5/2017    EGD ESOPHAGOGASTRODUODENOSCOPY WITH EXCHANGE OF DANIEL TUBE performed by Belle Sandoval MD at [facility]       Current Discharge Medication List      CONTINUE these medications which have NOT CHANGED    Details   levothyroxine (SYNTHROID) 25 MCG tablet Take 25 mcg by mouth Daily Height: 5' 1.81\" (1.57 m)        The patient was given the following medications while in the emergency department:  Orders Placed This Encounter   Medications    piperacillin-tazobactam (ZOSYN) 3.375 g in dextrose 5 % 50 mL IVPB (mini-bag)    vancomycin 1000 mg IVPB in 250 mL D5W addavial    DISCONTD: acetaminophen (TYLENOL) tablet 650 mg    aspirin EC tablet 81 mg    donepezil (ARICEPT) tablet 10 mg    levothyroxine (SYNTHROID) tablet 25 mcg    metoprolol tartrate (LOPRESSOR) tablet 25 mg    simvastatin (ZOCOR) tablet 20 mg    DISCONTD: 0.9 % sodium chloride infusion    sodium chloride flush 0.9 % injection 10 mL    sodium chloride flush 0.9 % injection 10 mL    acetaminophen (TYLENOL) tablet 650 mg    OR Linked Order Group     HYDROcodone-acetaminophen (NORCO) 5-325 MG per tablet 1 tablet     HYDROcodone-acetaminophen (NORCO) 5-325 MG per tablet 2 tablet    magnesium hydroxide (MILK OF MAGNESIA) 400 MG/5ML suspension 30 mL    docusate sodium (COLACE) capsule 100 mg    bisacodyl (DULCOLAX) suppository 10 mg    ondansetron (ZOFRAN) injection 4 mg    famotidine (PEPCID) tablet 20 mg    nicotine (NICODERM CQ) 21 MG/24HR 1 patch     If indicated/pateint smokes    DISCONTD: enoxaparin (LOVENOX) injection 40 mg    albuterol (PROVENTIL) nebulizer solution 2.5 mg    ipratropium-albuterol (DUONEB) nebulizer solution 1 ampule    piperacillin-tazobactam (ZOSYN) 3.375 g in dextrose 5 % 50 mL IVPB extended infusion (mini-bag)    miconazole (MICOTIN) 2 % cream    vancomycin 1000 mg IVPB in 250 mL D5W addavial    warfarin (COUMADIN) daily dosing (placeholder)     CONSULTS:  PHARMACY TO DOSE VANCOMYCIN  IP CONSULT TO INTERNAL MEDICINE  PHARMACY TO DOSE WARFARIN  IP CONSULT TO DIETITIAN    FINAL IMPRESSION      1. Pneumonia due to organism    2.  Atrial fibrillation, unspecified type ([facility]    3. Coagulopathy ([facility]          DISPOSITION/PLAN   DISPOSITION Admitted    PATIENT REFERRED TO:  Kallie aB REBECCA  [address] [facility]          DISCHARGE MEDICATIONS:  Current Discharge Medication List        Kuldeep Sibley MD  Attending Emergency Physician                       Kuldeep Sibley MD  11/20/17 6081

## 2017-11-20 NOTE — PLAN OF CARE
Problem: Risk for Impaired Skin Integrity  Goal: Tissue integrity - skin and mucous membranes  Structural intactness and normal physiological function of skin and  mucous membranes. Outcome: Met This Shift  No new areas of skin breakdown noted. Pt has areas of excoriation and redness noted to coccyx and periarea. Pt also has bruising noted to upper left arm.

## 2017-11-20 NOTE — H&P
alcohol. Drug Use:  has no drug history on file. Family History:     Family History   Problem Relation Age of Onset    Heart Disease Mother     Heart Disease Father        Review of Systems:     Positive and Negative as described in HPI. Review of Systems   Constitutional: Negative for chills and fever. Eyes: Negative for blurred vision and double vision. Respiratory: Negative for cough, sputum production and shortness of breath. Cardiovascular: Negative for chest pain and palpitations. Gastrointestinal: Negative for abdominal pain, nausea and vomiting. Genitourinary: Negative for dysuria. Neurological: Negative for dizziness, sensory change, focal weakness and headaches. Physical Exam:   BP (!) 123/93   Pulse 75   Temp 98.4 °F (36.9 °C) (Oral)   Resp 28   Ht 5' 1.81\" (1.57 m)   Wt 132 lb (59.9 kg)   SpO2 100%   BMI 24.29 kg/m²   Temp (24hrs), Av.4 °F (36.9 °C), Min:98.2 °F (36.8 °C), Max:98.5 °F (36.9 °C)    No results for input(s): POCGLU in the last 72 hours. No intake or output data in the 24 hours ending 17 1302    Physical Exam   Constitutional:   Overweight woman of stated age laying in bed appearing in no distress. HENT:   Head: Normocephalic. Eyes: Conjunctivae are normal. Right eye exhibits no discharge. No scleral icterus. Left ptosis and exotropia, per patient due to trauma when she was a teen   Neck: Neck supple. Cardiovascular: Exam reveals no friction rub. Irregularly irregular. 1+ radial pulses bilaterally   Pulmonary/Chest: Effort normal. No respiratory distress. She has no wheezes. She has rales (Right upper field anteriorly). She exhibits no tenderness. Diffuse crackles mid-axillary   Abdominal: Soft. Bowel sounds are normal. She exhibits no distension. There is no tenderness. There is no rebound and no guarding. PEG in place     Musculoskeletal: She exhibits no edema or tenderness. Neurological: She is alert.    Oriented to person, trend trop  - Blood cultures x2, sputum culture and stain, Legionella, strep pneumo  - Started on vancomycin, Zosyn  - Rt eval and treat  - Duonebs  - CBC, BMP  - DNR-CC, family aware of admission    HTN  - Resume Lopressor    HLD  - Resume Zocor    Hypothyroid  - Resume Synthroid    Dementia  - Resome Aricept    Candidasis  - Micotin to groin/buttocks BID      Consultations:   PHARMACY TO DOSE VANCOMYCIN  IP CONSULT TO INTERNAL MEDICINE  PHARMACY TO DOSE WARFARIN    Patient is admitted as inpatient status because of co-morbidities listed above, severity of signs and symptoms as outlined, requirement for current medical therapies and most importantly because of direct risk to patient if care not provided in a hospital setting.     Yen Lion MD  11/20/2017  1:02 PM    Copy sent to Dr. Cornelius Hull PA-C

## 2017-11-20 NOTE — PROGRESS NOTES
Pharmacy Note  Vancomycin Consult    Roxann Romero is a 80 y.o. female started on Vancomycin for Pneumonia; consult received from Dr. Percy Quesada to manage therapy. Also receiving the following antibiotics: Zosyn. Patient Active Problem List   Diagnosis    CAP (community acquired pneumonia)    Chronic atrial fibrillation ([facility]    Hyperlipidemia    Metabolic encephalopathy    UTI (urinary tract infection)    Dementia without behavioral disturbance    Lymph edema    Essential hypertension    Hypotension    Sepsis due to pneumonia ([facility]    Pneumonia due to organism    Streptococcal bacteremia    Pneumococcal pneumonia ([facility]    Urinary tract infection due to Klebsiella species    Late onset Alzheimer's disease without behavioral disturbance    Oropharyngeal dysphagia    Malnutrition ([facility]    Gastric bypass status for obesity    PEG [facility]    Dysphagia    Pneumoperitoneum       Allergies:  Review of patient's allergies indicates no known allergies. Temp max: 98.5 F    Recent Labs      11/20/17   0925   BUN  32*       Recent Labs      11/20/17   0925   CREATININE  <0.40*       Recent Labs      11/20/17   0925   WBC  16.5*       No intake or output data in the 24 hours ending 11/20/17 1033    Culture Date      Source                       Results  11/20                blood x2    Ht Readings from Last 1 Encounters:   11/20/17 5' 1.81\" (1.57 m)        Wt Readings from Last 1 Encounters:   11/20/17 132 lb (59.9 kg)         Body mass index is 24.29 kg/m². CrCl cannot be calculated (This lab value cannot be used to calculate CrCl because it is not a number: <0.40). Assessment/Plan:  Will initiate vancomycin 1000 mg x1 now. Timing of trough level will be determined based on culture results, renal function, and clinical response. Would suggest 1 gram q24h if Vanco continued upon admission. Thank you for the consult.   Will continue to follow.     Jenna Coyle,PharmD, 11/20/2017, 10:34 AM

## 2017-11-20 NOTE — ED NOTES
Bed: 03  Expected date:   Expected time:   Means of arrival:   Comments:     Cathryn Siddiqi, RN  11/20/17 7415

## 2017-11-20 NOTE — FLOWSHEET NOTE
11/20/17 1731   Encounter Summary   Services provided to: Patient   Referral/Consult From: [place]   Spiritual/Uatsdin   Type Spiritual support   Assessment Sleeping   Intervention Prayer   Outcome Did not respond

## 2017-11-21 PROBLEM — I50.32 CHRONIC DIASTOLIC CONGESTIVE HEART FAILURE (HCC): Status: ACTIVE | Noted: 2017-11-21

## 2017-11-21 LAB
ANION GAP SERPL CALCULATED.3IONS-SCNC: 8 MMOL/L (ref 9–17)
BUN BLDV-MCNC: 23 MG/DL (ref 8–23)
BUN/CREAT BLD: ABNORMAL (ref 9–20)
CALCIUM SERPL-MCNC: 8.1 MG/DL (ref 8.6–10.4)
CHLORIDE BLD-SCNC: 100 MMOL/L (ref 98–107)
CO2: 27 MMOL/L (ref 20–31)
CREAT SERPL-MCNC: <0.4 MG/DL (ref 0.5–0.9)
GFR AFRICAN AMERICAN: ABNORMAL ML/MIN
GFR NON-AFRICAN AMERICAN: ABNORMAL ML/MIN
GFR SERPL CREATININE-BSD FRML MDRD: ABNORMAL ML/MIN/{1.73_M2}
GFR SERPL CREATININE-BSD FRML MDRD: ABNORMAL ML/MIN/{1.73_M2}
GLUCOSE BLD-MCNC: 113 MG/DL (ref 70–99)
HCT VFR BLD CALC: 31.8 % (ref 36–46)
HEMOGLOBIN: 10.1 G/DL (ref 12–16)
INR BLD: 5.1
MCH RBC QN AUTO: 34.3 PG (ref 26–34)
MCHC RBC AUTO-ENTMCNC: 31.8 G/DL (ref 31–37)
MCV RBC AUTO: 107.8 FL (ref 80–100)
PDW BLD-RTO: 15.1 % (ref 11.5–14.9)
PLATELET # BLD: 302 K/UL (ref 150–450)
PMV BLD AUTO: 9.4 FL (ref 6–12)
POTASSIUM SERPL-SCNC: 4 MMOL/L (ref 3.7–5.3)
PROTHROMBIN TIME: 60.3 SEC (ref 9.7–12)
RBC # BLD: 2.95 M/UL (ref 4–5.2)
SODIUM BLD-SCNC: 135 MMOL/L (ref 135–144)
VANCOMYCIN TROUGH DATE LAST DOSE: NORMAL
VANCOMYCIN TROUGH DOSE AMOUNT: NORMAL
VANCOMYCIN TROUGH TIME LAST DOSE: 1502
VANCOMYCIN TROUGH: 18.7 UG/ML (ref 10–20)
WBC # BLD: 14 K/UL (ref 3.5–11)

## 2017-11-21 PROCEDURE — 80048 BASIC METABOLIC PNL TOTAL CA: CPT

## 2017-11-21 PROCEDURE — 85027 COMPLETE CBC AUTOMATED: CPT

## 2017-11-21 PROCEDURE — 6360000002 HC RX W HCPCS: Performed by: HOSPITALIST

## 2017-11-21 PROCEDURE — 2580000003 HC RX 258: Performed by: HOSPITALIST

## 2017-11-21 PROCEDURE — 36415 COLL VENOUS BLD VENIPUNCTURE: CPT

## 2017-11-21 PROCEDURE — 6360000002 HC RX W HCPCS: Performed by: EMERGENCY MEDICINE

## 2017-11-21 PROCEDURE — 85610 PROTHROMBIN TIME: CPT

## 2017-11-21 PROCEDURE — 94761 N-INVAS EAR/PLS OXIMETRY MLT: CPT

## 2017-11-21 PROCEDURE — 2060000000 HC ICU INTERMEDIATE R&B

## 2017-11-21 PROCEDURE — 6370000000 HC RX 637 (ALT 250 FOR IP): Performed by: HOSPITALIST

## 2017-11-21 PROCEDURE — 2580000003 HC RX 258: Performed by: EMERGENCY MEDICINE

## 2017-11-21 PROCEDURE — 94640 AIRWAY INHALATION TREATMENT: CPT

## 2017-11-21 PROCEDURE — 94760 N-INVAS EAR/PLS OXIMETRY 1: CPT

## 2017-11-21 PROCEDURE — 80202 ASSAY OF VANCOMYCIN: CPT

## 2017-11-21 PROCEDURE — 99223 1ST HOSP IP/OBS HIGH 75: CPT | Performed by: INTERNAL MEDICINE

## 2017-11-21 RX ORDER — ASPIRIN 81 MG/1
81 TABLET, CHEWABLE ORAL DAILY
Status: DISCONTINUED | OUTPATIENT
Start: 2017-11-21 | End: 2017-11-23 | Stop reason: HOSPADM

## 2017-11-21 RX ADMIN — PIPERACILLIN SODIUM,TAZOBACTAM SODIUM 3.38 G: 3; .375 INJECTION, POWDER, FOR SOLUTION INTRAVENOUS at 04:18

## 2017-11-21 RX ADMIN — IPRATROPIUM BROMIDE AND ALBUTEROL SULFATE 1 AMPULE: .5; 3 SOLUTION RESPIRATORY (INHALATION) at 10:32

## 2017-11-21 RX ADMIN — IPRATROPIUM BROMIDE AND ALBUTEROL SULFATE 1 AMPULE: .5; 3 SOLUTION RESPIRATORY (INHALATION) at 07:00

## 2017-11-21 RX ADMIN — MICONAZOLE NITRATE: 20 CREAM TOPICAL at 11:13

## 2017-11-21 RX ADMIN — IPRATROPIUM BROMIDE AND ALBUTEROL SULFATE 1 AMPULE: .5; 3 SOLUTION RESPIRATORY (INHALATION) at 14:37

## 2017-11-21 RX ADMIN — LEVOTHYROXINE SODIUM 25 MCG: 25 TABLET ORAL at 06:17

## 2017-11-21 RX ADMIN — VANCOMYCIN HYDROCHLORIDE 1000 MG: 10 INJECTION, POWDER, LYOPHILIZED, FOR SOLUTION INTRAVENOUS at 15:02

## 2017-11-21 RX ADMIN — ASPIRIN 81 MG 81 MG: 81 TABLET ORAL at 11:01

## 2017-11-21 RX ADMIN — SIMVASTATIN 20 MG: 20 TABLET, FILM COATED ORAL at 20:50

## 2017-11-21 RX ADMIN — PIPERACILLIN SODIUM,TAZOBACTAM SODIUM 3.38 G: 3; .375 INJECTION, POWDER, FOR SOLUTION INTRAVENOUS at 20:50

## 2017-11-21 RX ADMIN — IPRATROPIUM BROMIDE AND ALBUTEROL SULFATE 1 AMPULE: .5; 3 SOLUTION RESPIRATORY (INHALATION) at 19:37

## 2017-11-21 RX ADMIN — METOPROLOL TARTRATE 25 MG: 25 TABLET ORAL at 11:02

## 2017-11-21 RX ADMIN — FAMOTIDINE 20 MG: 20 TABLET, FILM COATED ORAL at 11:01

## 2017-11-21 RX ADMIN — PIPERACILLIN SODIUM,TAZOBACTAM SODIUM 3.38 G: 3; .375 INJECTION, POWDER, FOR SOLUTION INTRAVENOUS at 11:04

## 2017-11-21 RX ADMIN — FAMOTIDINE 20 MG: 20 TABLET, FILM COATED ORAL at 20:50

## 2017-11-21 RX ADMIN — MICONAZOLE NITRATE: 20 CREAM TOPICAL at 21:00

## 2017-11-21 RX ADMIN — DONEPEZIL HYDROCHLORIDE 10 MG: 10 TABLET, FILM COATED ORAL at 20:50

## 2017-11-21 ASSESSMENT — PAIN SCALES - GENERAL
PAINLEVEL_OUTOF10: 0

## 2017-11-21 NOTE — FLOWSHEET NOTE
11/21/17 1012   Encounter Summary   Services provided to: Patient   Referral/Consult From: Rounding   Continue Visiting (11/21/17)   Complexity of Encounter Low   Length of Encounter 15 minutes   Routine   Type Initial   Spiritual/Orthodoxy   Type Ritual   Intervention Anointing   Sacraments   Sacrament of Sick-Anointing Anointed  (Fr Morales 11/21/17)

## 2017-11-21 NOTE — PLAN OF CARE
Problem: Nutrition  Goal: Optimal nutrition therapy  Outcome: Ongoing  Nutrition Problem: Swallowing difficulty  Intervention: Food and/or Nutrient Delivery: Continue NPO, Continue current Tube Feeding  Nutritional Goals: EN to meet >85% estimated needs

## 2017-11-21 NOTE — DISCHARGE SUMMARY
[address]    Discharge Summary     Patient ID: Eliecer Schaefer  :  1935   MRN: [id number]     ACCOUNT:  [de-identified]   Patient's PCP: Radha Farooq PA-C  Admit Date: 2017   Discharge Date: 2017  Length of Stay: 2  Code Status:  DNR-CC  Admitting Physician: Monster Schaefer MD  Discharge Physician: Caroline Steven MD     Active Discharge Diagnoses:     Primary Problem  Pneumonia due to organism      [place] Problems    Diagnosis Date Noted    Chronic diastolic congestive heart failure ([facility] [I50.32] 2017    Hypothyroid [E03.9] 2017    [facility] [Z93.1]     Pneumonia due to organism [J18.9]     Essential hypertension [I10] 2016    Dementia without behavioral disturbance [F03.90] 2016    Hyperlipidemia [E78.5] 2016    Chronic atrial fibrillation ([facility] [I48.2] 2016     Admission Condition:  fair     Discharged Condition: stable    Hospital Stay:     Hospital Course: Eliecer Schaefer is a 80 y.o. female who was admitted for the management of right upper/middle pneumonia, presented to ER with Shortness of Breath    Patient afebrile, vitals stable, without hypoxia during admission. Resumed tube feeds on day zero. Patient oriented x2 during stay, indicates she wants to go back to facility. Family aware with continued code status of DNR-CC, would like to treat pneumonia and have patient go back to [facility]. Loose stools noted, started Flagyl. Will continue antibiotics on discharge. On day of discharge patient continues to have no complaints, discharging to [facility] at baseline.     Significant therapeutic interventions: N/A    Significant Diagnostic Studies:   Labs / Micro:  CBC:   Lab Results   Component Value Date    WBC 14.0 2017    RBC 2.95 2017    RBC 3.39 2012    HGB 10.1 2017    HCT 31.8 2017 11/20/2017  EXAMINATION: AP AND LATERAL VIEWS OF THE LEFT HUMERUS 11/20/2017 9:47 am COMPARISON: None. HISTORY: ORDERING SYSTEM PROVIDED HISTORY: pain and swelling upper humerus TECHNOLOGIST PROVIDED HISTORY: Reason for exam:->pain and swelling upper humerus Ordering Physician Provided Reason for Exam: Left proximal humerus pain w/ swelling x2-3 days Acuity: Acute Type of Exam: Initial FINDINGS: Normal glenohumeral and elbow alignment. No acute fracture. No aggressive lytic or blastic lesions. Soft tissues grossly intact. No acute osseous abnormality left humerus. Consultations:    Consults:     Final Specialist Recommendations/Findings:   PHARMACY TO DOSE VANCOMYCIN  IP CONSULT TO INTERNAL MEDICINE  PHARMACY TO DOSE WARFARIN  IP CONSULT TO DIETITIAN      The patient was seen and examined on day of discharge and this discharge summary is in conjunction with any daily progress note from day of discharge. Discharge plan:     Disposition: Skilled Facility    Physician Follow Up:   Joseph Rasheed PA-C  [address] [facility]  [address] [facility],[address]  [address] [address]  [phone]    In 1 week      Requiring Further Evaluation/Follow Up POST HOSPITALIZATION/Incidental Findings: N/A    Diet: PEG feeds    Activity: As tolerated    Instructions to Patient: Complete antibiotics as instructed. Continue medications as instructed. Follow up with PCP.     Discharge Medications:      Medication List      START taking these medications    azithromycin 250 MG tablet  Commonly known as:  ZITHROMAX  Take 1 tab twice a day for 5 days until gone     metroNIDAZOLE 500 MG tablet  Commonly known as:  FLAGYL  Take 1 tablet by mouth 3 times daily for 10 days        CONTINUE taking these medications    acetaminophen 325 MG tablet  Commonly known as:  TYLENOL  2 tablets by Per NG tube route every 4 hours as needed for Pain or Fever     aspirin 81 MG tablet  Take 1 tablet by mouth daily     donepezil 10 MG tablet  Commonly known as:  ARICEPT  1

## 2017-11-21 NOTE — CARE COORDINATION
DISCHARGE PLANNING NOTE:    I spoke with this patients son Karen Galo and he informed me that this patient is a full time resident at the [facility] on the 3rd floor and they would like her to return there. I called Lonnie Crook and informed him of this information. Will follow.      Electronically signed by Laura Fulton RN on 11/21/2017 at 12:23 PM

## 2017-11-21 NOTE — PROGRESS NOTES
Pharmacy Note  Warfarin Consult follow-up      Recent Labs      11/20/17   0925  11/21/17   0526   INR  6.6*  5.1*     Recent Labs      11/20/17   0925  11/21/17   0526   HGB  10.2*  10.1*   HCT  30.5*  31.8*   PLT  293  302       Significant Drug-Drug Interactions:  New warfarin drug-drug interactions: none  Discontinued drug-drug interactions: none  Current warfarin drug-drug interactions: Zosyn, Vanco, Zocor, Aspirin      Date             INR        Dose given previous day  Dose scheduled for today  11/21/2017            5.1       held           HOLD        Notes:                   INR still elevated, will continue to hold warfarin at this time. Daily PT/INR while inpatient.      Jenna Coyle,PharmD, 11/21/2017, 12:19 PM

## 2017-11-21 NOTE — PROGRESS NOTES
[facility]    Progress Note    11/21/2017    8:35 AM    Name:   Sandie Lesches  MRN:     [id number]     Acct:      [de-identified]   Room:   2114/2114-01   Day:  1  Admit Date:  11/20/2017  8:54 AM    PCP:   Batool Hooks PA-C  Code Status:  DNR-CC    Subjective:     C/C:   Chief Complaint   Patient presents with    Shortness of Breath     Interval History Status: not changed. Continues to have no complaints this morning, stating she wants to go back to [facility]. O2 sat mid-high 90s on room air, nasal cannula ordered as needed. Blood cultures 1/2 positive coagulase negative staph, likely 2/2 contamination. Patient stable without evidence of SIRS/sepsis. Brief History:     The patient is a 80 y.o. Non-/non  female who presents with Shortness of Breath   and she is admitted to the hospital for the management of right upper/mid lobe pneumonia. Patient comes from [facility]. Per documentation pt was found to be hypoxic with NC turned off, once O2 resumed pt had O2 sat of 98%. Hx of HTN, HLD, dementia, hypothyroid; bedridden. No family present for exam. Patient oriented to person and place, able to answer most questions. Denies any symptoms, states she wants to go back to [facility]. No fevers/chills. No nausea/vomiting. No SOB/cough with sputum. No chest pain/palpitations. No abdominal pain. Review of Systems:     ROS  Constitutional: Negative for chills and fever. Eyes: Negative for blurred vision and double vision. Respiratory: Negative for cough, sputum production and shortness of breath. Cardiovascular: Negative for chest pain and palpitations. Gastrointestinal: Negative for abdominal pain, nausea and vomiting. Genitourinary: Negative for dysuria. Neurological: Negative for dizziness, sensory change, focal weakness and headaches. Medications:      Allergies:  No Known Allergies    Current Meds:   Scheduled Meds:    aspirin  81 mg Oral Daily    donepezil  10 mg Per G Tube Nightly    levothyroxine  25 mcg Oral Daily    metoprolol tartrate  25 mg Per G Tube Daily    simvastatin  20 mg Per G Tube Nightly    sodium chloride flush  10 mL Intravenous 2 times per day    famotidine  20 mg Oral BID    ipratropium-albuterol  1 ampule Inhalation Q4H WA    piperacillin-tazobactam (ZOSYN) 3.375 g in dextrose 5% IVPB extended infusion (mini-bag)  3.375 g Intravenous Q8H    miconazole   Topical BID    vancomycin  1,000 mg Intravenous Q12H    warfarin (COUMADIN) daily dosing (placeholder)   Other RX Placeholder    pneumococcal 13-valent conjugate  0.5 mL Intramuscular Once    docusate  100 mg Per G Tube BID     Continuous Infusions:    PRN Meds: sodium chloride flush, acetaminophen, HYDROcodone 5 mg - acetaminophen **OR** HYDROcodone 5 mg - acetaminophen, magnesium hydroxide, bisacodyl, ondansetron, nicotine, albuterol    Data:     Past Medical History:   has a past medical history of Asthma; Cancer ([facility] Cerebral artery occlusion with cerebral infarction ([facility] Chronic diastolic congestive heart failure ([facility] Dementia; Depression; Essential hypertension; Gastric bypass status for obesity; Hyperlipidemia; Hypothyroid; and Lymphadenopathy. Social History:   reports that she has quit smoking. She does not have any smokeless tobacco history on file. She reports that she does not drink alcohol. Family History:   Family History   Problem Relation Age of Onset    Heart Disease Mother     Heart Disease Father        Vitals:  BP (!) 110/97   Pulse 72   Temp 97.8 °F (36.6 °C) (Oral)   Resp 16   Ht 5' 1.81\" (1.57 m)   Wt 132 lb (59.9 kg)   SpO2 91%   BMI 24.29 kg/m²   Temp (24hrs), Av.1 °F (36.7 °C), Min:97.8 °F (36.6 °C), Max:98.5 °F (36.9 °C)    No results for input(s): POCGLU in the last 72 hours. I/O (24Hr):     Intake/Output Summary (Last 24 hours) at 17 9676  Last data filed at 17 1837 osseous abnormality left humerus. Physical Examination:        Physical Exam  Constitutional:   Overweight woman of stated age laying in bed appearing in no distress. HENT:   Head: Normocephalic. Eyes: Conjunctivae are normal. Right eye exhibits no discharge. No scleral icterus. Left ptosis and exotropia, per patient due to trauma when she was a teen   Neck: Neck supple. Cardiovascular: Exam reveals no friction rub. Irregularly irregular. 1+ radial pulses bilaterally   Pulmonary/Chest: Effort normal. No respiratory distress. She has no wheezes. She has diffuse right-sided crackles anteriorly and posteriorly. She exhibits no tenderness. Abdominal: Soft. Bowel sounds are normal. She exhibits no distension. There is no tenderness. There is no rebound and no guarding. PEG in place  Musculoskeletal: She exhibits no edema or tenderness. Neurological: She is alert. Oriented to person, place. Skin: Skin is warm and dry. She is not diaphoretic.  There is erythema (Left upper extremity).        Assessment:        Primary Problem  Pneumonia due to organism    Active Hospital Problems    Diagnosis Date Noted    [facility] [I50.32] 11/21/2017    Hypothyroid [E03.9] 11/20/2017    PEG (percutaneous endoscopic gastrostomy) status ([facility] [Z93.1]     Pneumonia due to organism [J18.9]     Essential hypertension [I10] 11/03/2016    Dementia without behavioral disturbance [F03.90] 11/03/2016    Hyperlipidemia [E78.5] 04/08/2016    Chronic atrial fibrillation ([facility] [I48.2] 04/08/2016       Plan:        Pneumonia without SIRS  - Transferred from [facility], treat as possible MRSA, healthcare associated  - Chest radiograph \"right mid to upper lung airspace opacities\"  - Afebrile, BP/HR stable, WBC 16.5, lactic acid 1.1, repeat lactic acid 1.3  - Troponin negative, trend trop  - Blood cultures 1/2 positive coagulase negative staph, likely 2/2 contamination  - Sputum culture and stain, Legionella, strep pneumo  - Started on vancomycin, Zosyn  - Rt eval and treat  - Duonebs  - CBC, BMP  - DNR-CC, family aware of admission    Chronic diastolic CHF  - Echo 8/7802 EF >55% with evidence of diastolic dysfunction  - BNP on admission 2310  - Holding IVF, continue PEG feeds     HTN  - Resume Lopressor     HLD  - Resume Zocor     Hypothyroid  - Resume Synthroid     Dementia  - Resome Aricept     Candidasis  - Micotin to groin/buttocks BID    Disposition: Return to [facility].       Keith Duran MD  11/21/2017  8:35 AM

## 2017-11-22 VITALS
TEMPERATURE: 98.8 F | RESPIRATION RATE: 18 BRPM | DIASTOLIC BLOOD PRESSURE: 52 MMHG | HEART RATE: 89 BPM | SYSTOLIC BLOOD PRESSURE: 104 MMHG | OXYGEN SATURATION: 96 % | HEIGHT: 62 IN | WEIGHT: 155.87 LBS | BODY MASS INDEX: 28.68 KG/M2

## 2017-11-22 LAB
C DIFFICILE TOXINS, PCR: NORMAL
CREAT SERPL-MCNC: <0.4 MG/DL (ref 0.5–0.9)
GFR AFRICAN AMERICAN: ABNORMAL ML/MIN
GFR NON-AFRICAN AMERICAN: ABNORMAL ML/MIN
GFR SERPL CREATININE-BSD FRML MDRD: ABNORMAL ML/MIN/{1.73_M2}
GFR SERPL CREATININE-BSD FRML MDRD: ABNORMAL ML/MIN/{1.73_M2}
INR BLD: 4.4
PROTHROMBIN TIME: 51.6 SEC (ref 9.7–12)
SPECIMEN DESCRIPTION: NORMAL

## 2017-11-22 PROCEDURE — 97166 OT EVAL MOD COMPLEX 45 MIN: CPT

## 2017-11-22 PROCEDURE — 2580000003 HC RX 258: Performed by: EMERGENCY MEDICINE

## 2017-11-22 PROCEDURE — 6360000002 HC RX W HCPCS: Performed by: EMERGENCY MEDICINE

## 2017-11-22 PROCEDURE — 2580000003 HC RX 258: Performed by: INTERNAL MEDICINE

## 2017-11-22 PROCEDURE — 6370000000 HC RX 637 (ALT 250 FOR IP): Performed by: NURSE PRACTITIONER

## 2017-11-22 PROCEDURE — 87493 C DIFF AMPLIFIED PROBE: CPT

## 2017-11-22 PROCEDURE — 82565 ASSAY OF CREATININE: CPT

## 2017-11-22 PROCEDURE — 2500000003 HC RX 250 WO HCPCS: Performed by: NURSE PRACTITIONER

## 2017-11-22 PROCEDURE — 94640 AIRWAY INHALATION TREATMENT: CPT

## 2017-11-22 PROCEDURE — 6370000000 HC RX 637 (ALT 250 FOR IP): Performed by: HOSPITALIST

## 2017-11-22 PROCEDURE — 99239 HOSP IP/OBS DSCHRG MGMT >30: CPT | Performed by: INTERNAL MEDICINE

## 2017-11-22 PROCEDURE — 90670 PCV13 VACCINE IM: CPT | Performed by: INTERNAL MEDICINE

## 2017-11-22 PROCEDURE — 6360000002 HC RX W HCPCS: Performed by: INTERNAL MEDICINE

## 2017-11-22 PROCEDURE — 97162 PT EVAL MOD COMPLEX 30 MIN: CPT

## 2017-11-22 PROCEDURE — 36415 COLL VENOUS BLD VENIPUNCTURE: CPT

## 2017-11-22 PROCEDURE — 2580000003 HC RX 258: Performed by: HOSPITALIST

## 2017-11-22 PROCEDURE — 85610 PROTHROMBIN TIME: CPT

## 2017-11-22 PROCEDURE — 6360000002 HC RX W HCPCS: Performed by: HOSPITALIST

## 2017-11-22 PROCEDURE — G0009 ADMIN PNEUMOCOCCAL VACCINE: HCPCS | Performed by: INTERNAL MEDICINE

## 2017-11-22 PROCEDURE — 94761 N-INVAS EAR/PLS OXIMETRY MLT: CPT

## 2017-11-22 RX ORDER — AZITHROMYCIN 250 MG/1
TABLET, FILM COATED ORAL
Qty: 10 TABLET | Refills: 0 | Status: SHIPPED | OUTPATIENT
Start: 2017-11-22 | End: 2017-12-02

## 2017-11-22 RX ORDER — METRONIDAZOLE 500 MG/1
500 TABLET ORAL 3 TIMES DAILY
Qty: 30 TABLET | Refills: 0 | Status: SHIPPED | OUTPATIENT
Start: 2017-11-22 | End: 2017-12-02

## 2017-11-22 RX ADMIN — LEVOTHYROXINE SODIUM 25 MCG: 25 TABLET ORAL at 06:19

## 2017-11-22 RX ADMIN — FAMOTIDINE 20 MG: 20 TABLET, FILM COATED ORAL at 09:21

## 2017-11-22 RX ADMIN — PIPERACILLIN SODIUM,TAZOBACTAM SODIUM 3.38 G: 3; .375 INJECTION, POWDER, FOR SOLUTION INTRAVENOUS at 05:26

## 2017-11-22 RX ADMIN — IPRATROPIUM BROMIDE AND ALBUTEROL SULFATE 1 AMPULE: .5; 3 SOLUTION RESPIRATORY (INHALATION) at 15:17

## 2017-11-22 RX ADMIN — PIPERACILLIN SODIUM,TAZOBACTAM SODIUM 3.38 G: 3; .375 INJECTION, POWDER, FOR SOLUTION INTRAVENOUS at 13:44

## 2017-11-22 RX ADMIN — MICONAZOLE NITRATE: 20 CREAM TOPICAL at 09:21

## 2017-11-22 RX ADMIN — DONEPEZIL HYDROCHLORIDE 10 MG: 10 TABLET, FILM COATED ORAL at 19:34

## 2017-11-22 RX ADMIN — Medication: at 19:36

## 2017-11-22 RX ADMIN — ASPIRIN 81 MG 81 MG: 81 TABLET ORAL at 09:21

## 2017-11-22 RX ADMIN — VANCOMYCIN HYDROCHLORIDE 1000 MG: 10 INJECTION, POWDER, LYOPHILIZED, FOR SOLUTION INTRAVENOUS at 03:04

## 2017-11-22 RX ADMIN — PNEUMOCOCCAL 13-VALENT CONJUGATE VACCINE 0.5 ML: 2.2; 2.2; 2.2; 2.2; 2.2; 4.4; 2.2; 2.2; 2.2; 2.2; 2.2; 2.2; 2.2 INJECTION, SUSPENSION INTRAMUSCULAR at 15:59

## 2017-11-22 RX ADMIN — MICONAZOLE NITRATE: 1.42 POWDER TOPICAL at 19:44

## 2017-11-22 RX ADMIN — METOPROLOL TARTRATE 25 MG: 25 TABLET ORAL at 09:21

## 2017-11-22 RX ADMIN — SIMVASTATIN 20 MG: 20 TABLET, FILM COATED ORAL at 19:34

## 2017-11-22 RX ADMIN — IPRATROPIUM BROMIDE AND ALBUTEROL SULFATE 1 AMPULE: .5; 3 SOLUTION RESPIRATORY (INHALATION) at 11:14

## 2017-11-22 RX ADMIN — Medication 10 ML: at 19:42

## 2017-11-22 RX ADMIN — FAMOTIDINE 20 MG: 20 TABLET, FILM COATED ORAL at 19:34

## 2017-11-22 RX ADMIN — VANCOMYCIN HYDROCHLORIDE 1000 MG: 1 INJECTION, POWDER, LYOPHILIZED, FOR SOLUTION INTRAVENOUS at 18:13

## 2017-11-22 RX ADMIN — IPRATROPIUM BROMIDE AND ALBUTEROL SULFATE 1 AMPULE: .5; 3 SOLUTION RESPIRATORY (INHALATION) at 07:16

## 2017-11-22 ASSESSMENT — PAIN DESCRIPTION - ONSET: ONSET: ON-GOING

## 2017-11-22 ASSESSMENT — PAIN DESCRIPTION - DESCRIPTORS: DESCRIPTORS: PATIENT UNABLE TO DESCRIBE

## 2017-11-22 ASSESSMENT — PAIN SCALES - WONG BAKER: WONGBAKER_NUMERICALRESPONSE: 8

## 2017-11-22 ASSESSMENT — PAIN DESCRIPTION - PAIN TYPE: TYPE: ACUTE PAIN

## 2017-11-22 ASSESSMENT — PAIN DESCRIPTION - ORIENTATION: ORIENTATION: LEFT

## 2017-11-22 ASSESSMENT — PAIN DESCRIPTION - LOCATION: LOCATION: ARM;SHOULDER

## 2017-11-22 ASSESSMENT — PAIN DESCRIPTION - FREQUENCY: FREQUENCY: INTERMITTENT

## 2017-11-22 NOTE — PROGRESS NOTES
humerus. Physical Examination:        Physical Exam  Constitutional:   Overweight woman of stated age laying in bed appearing in no distress. HENT:   Head: Normocephalic. Eyes: Conjunctivae are normal. Right eye exhibits no discharge. No scleral icterus. Left ptosis and exotropia, per patient due to trauma when she was a teen   Neck: Neck supple. Cardiovascular: Exam reveals no friction rub. Irregularly irregular. 1+ radial pulses bilaterally   Pulmonary/Chest: Effort normal. No respiratory distress. She has no wheezes. She has diffuse right-sided crackles anteriorly and posteriorly. She exhibits no tenderness. Abdominal: Soft. Bowel sounds are normal. She exhibits no distension. There is no tenderness. There is no rebound and no guarding. PEG in place  Musculoskeletal: She exhibits no edema or tenderness. Neurological: She is alert. Oriented to person, place. Skin: Skin is warm and dry. She is not diaphoretic.  There is erythema (Left upper extremity).        Assessment:        Primary Problem  Pneumonia due to organism    Active Hospital Problems    Diagnosis Date Noted    Chronic diastolic congestive heart failure ([facility] [I50.32] 11/21/2017    Hypothyroid [E03.9] 11/20/2017    PEG (percutaneous endoscopic gastrostomy) status ([facility] [Z93.1]     Pneumonia due to organism [J18.9]     Essential hypertension [I10] 11/03/2016    Dementia without behavioral disturbance [F03.90] 11/03/2016    Hyperlipidemia [E78.5] 04/08/2016    Chronic atrial fibrillation ([facility] [I48.2] 04/08/2016       Plan:        Pneumonia without SIRS  - Transferred from [facility], treat as possible MRSA, healthcare associated  - Chest radiograph \"right mid to upper lung airspace opacities\"  - Afebrile, BP/HR stable, WBC 16.5, lactic acid 1.1, repeat lactic acid 1.3  - Troponin negative, trend trop  - Blood cultures 1/2 positive coagulase negative staph, likely 2/2 contamination  - Sputum culture and stain, Legionella,

## 2017-11-22 NOTE — PROGRESS NOTES
Pharmacy Note  Warfarin Consult follow-up      Recent Labs      11/20/17 0925  11/21/17   0526  11/22/17   0548   INR  6.6*  5.1*  4.4     Recent Labs      11/20/17 0925 11/21/17   0526   HGB  10.2*  10.1*   HCT  30.5*  31.8*   PLT  293  302       Significant Drug-Drug Interactions:  New warfarin drug-drug interactions: none  Discontinued drug-drug interactions: none  Current warfarin drug-drug interactions: Zosyn, Vanco, Zocor, Aspirin         Date             INR        Dose given previous day  Dose scheduled for today  11/22/2017      4.4            held      Continue to hold        Notes:     INR Still elevated,  continue to hold warfarin               Daily PT/INR while inpatient. Jamie Hearn. Ph.  11/22/2017  9:22 AM

## 2017-11-22 NOTE — PROGRESS NOTES
Physical Therapy    Facility/Department: [facility] PROGRESSIVE CARE  Initial Assessment    NAME: Taty Sawyer  : 1935  MRN: [id number]    Date of Service: 2017    Patient Diagnosis(es): The primary encounter diagnosis was Pneumonia due to organism. Diagnoses of Atrial fibrillation, unspecified type ([facility] and Coagulopathy ([facility] were also pertinent to this visit. has a past medical history of Asthma; Cancer ([facility] Cerebral artery occlusion with cerebral infarction ([facility] Chronic diastolic congestive heart failure ([facility] Dementia; Depression; Essential hypertension; Gastric bypass status for obesity; Hyperlipidemia; Hypothyroid; and Lymphadenopathy. has a past surgical history that includes Tubal ligation; Tonsillectomy and adenoidectomy; Chaparro-en-Y Gastric Bypass; egd percutaneous placement gastrostomy tube (N/A, 3/23/2017); Upper gastrointestinal endoscopy (2017); Upper gastrointestinal endoscopy (2017); and Upper gastrointestinal endoscopy (N/A, 2017). Restrictions  Restrictions/Precautions  Restrictions/Precautions: Fall Risk, General Precautions  Required Braces or Orthoses?: No  Vision/Hearing  Vision: Impaired  Vision Exceptions: Wears glasses at all times (L eye does not open)  Hearing: Within functional limits     Subjective  General  Patient assessed for rehabilitation services?: Yes  Additional Pertinent Hx: patient is a 80 y.o. Non-/non  female who presents with Shortness of Breath, and she is admitted to the hospital for the management of right upper/mid lobe pneumonia  Referral Date : 17  Diagnosis: pneumonia  Pain Screening  Patient Currently in Pain: Denies  Pain Assessment  Pain Assessment: Faces  Ledbetter-Baker Pain Rating: Hurts whole lot  Pain Type: Acute pain  Pain Location: Arm; Shoulder (with movement)  Pain Orientation: Left  Pain Descriptors: Patient unable to describe  Pain Frequency: Intermittent  Pain Onset: On-going  Pain Intervention(s): her pre morbid level, pt total dependnet for bed mobility and transfers at [facility], no need for skilled PT. Decision Making: Low Complexity  No Skilled PT: At baseline function  REQUIRES PT FOLLOW UP: No  Activity Tolerance  Activity Tolerance: Patient limited by pain     Discharge Recommendations:  ECF without PT      Plan   Plan  Times per week: NA  Safety Devices  Type of devices: Left in bed    G-Code     OutComes Score                                           Goals  Short term goals  Time Frame for Short term goals: NA  Patient Goals   Patient goals : I like to sit up in a chair.        Therapy Time   Individual Concurrent Group Co-treatment   Time In 0859         Time Out 0923         Minutes 24                 Lesli Lee, PT

## 2017-11-22 NOTE — PROGRESS NOTES
Wheelchair-manual ([place] lift)  Receives Help From: Personal care attendant  ADL Assistance: Needs assistance  Homemaking Assistance: Needs assistance  Additional Comments: Pt is a poor historian. Pt lives at [facility] and reports that staff use a carson lift to get Pt out of bed and into a w/c. Pt requires total assist for LB ADLs and S/U for UB ADLs. Pt reports that she does not use toilet or bedpan but instead uses a brief for voiding. Objective  Cognition  Overall Cognitive Status: Impaired (Pt has hx of dementia)       ADL  Feeding: NPO  Grooming: Moderate assistance  UE Bathing: Moderate assistance  LE Bathing: Dependent/Total  UE Dressing: Moderate assistance  LE Dressing: Dependent/Total  Toileting: Dependent/Total  Additional Comments: Pt is at baseline for LB ADLs. Pt reports that she is able to complete UB ADLs and grooming tasks with S/U at [facility]. However, due to Pt's L shoulder pain this date, Pt to require Mod A for UB tasks this date. UE Function  LUE Strength  Gross LUE Strength: Exceptions to [facility]  L Hand Grasp: 3+/5  LUE Strength Comment: Did not assess shoulder due to pain. Did not asses elbow due to swelling and high INR value. LUE PROM (degrees)  LUE PROM: Exceptions  LUE General PROM: Pt unable to tolerate PROM of L shoulder. Nursing notified of Pt's acute pain. LUE AROM (degrees)  LUE AROM : Exceptions  LUE General AROM: Pt unable to move shoulder due to pain. Elbow/wrist WFL. Left Hand AROM (degrees)  Left Hand AROM: WFL  RUE Strength  Gross RUE Strength: Ex[facility] to [facility]  R Hand Grasp: 4-/5  RUE Strength Comment: Did not assess shoulder/elbow due to swelling and high INR value. RUE PROM (degrees)  RUE PROM: WFL  RUE AROM (degrees)  RUE General AROM: Pt did not actively move R shoulder. Elbow/wrist WFL.      Right Hand AROM (degrees)  Right Hand AROM: WFL    Fine Motor Skills  Coordination  Movements Are Fluid And Coordinated: No  Coordination and Movement description: Strengthening, ROM, Self-Care / ADL    Equipment Recommendations  Equipment Needed:  (TBD)  OT Individual Minutes  Time In: 1539  Time Out: 8489  Minutes: 24    Electronically signed by CONSUELO Oropeza on 11/22/17 at 10:30 AM

## 2017-11-22 NOTE — CARE COORDINATION
ONGOING DISCHARGE PLAN:    Plan remains for pt to return to [facility] Pt remains on IV Zosyn and Vanco.  Will continue to follow along with LSW.     Electronically signed by Jeanette Heredia RN on 11/22/2017 at 9:26 AM

## 2017-11-22 NOTE — PROGRESS NOTES
Pharmacy Vancomycin Consult     Vancomycin Day: 2  Current Dosin mg every 12 hours    Temp max:  37.1    Recent Labs      17   0925  17   05   BUN  32*  23       Recent Labs      17   05   CREATININE  <0.40*  <0.40*       Recent Labs      17   0917   05   WBC  16.5*  14.0*         Intake/Output Summary (Last 24 hours) at 17 2356  Last data filed at 17 2100   Gross per 24 hour   Intake  ml   Output 0 ml   Net  ml       Culture Date      Source                       Results   Blood pending   Blood Staph     Ht Readings from Last 1 Encounters:   17 5' 1.81\" (1.57 m)        Wt Readings from Last 1 Encounters:   17 132 lb (59.9 kg)         Body mass index is 24.29 kg/m². CrCl cannot be calculated (This lab value cannot be used to calculate CrCl because it is not a number: <0.40). Trough: 18.7 at 2308 on     Assessment/Plan:  Last dose given 3 hours late, so trough drawn a little early. Suspect true trough to be slightly lower, but still with target range 15-20. Continue with current dosing of 1000 mg every 12 hours. Will continue to follow.    Unknown Adela, Pharm D, Raymond 12  2017 11:59 PM

## 2017-11-22 NOTE — PROGRESS NOTES
Pharmacy Vancomycin Consult                  Vancomycin Day: 3  Dose = 1000 mg every 12 hours. Blood culture of 11/20/17  with Coag Neg Staph. Patient's labs, cultures, vitals, and vancomycin regimen reviewed. No changes today. Rekha Cárdenas. Ph.  11/22/2017  10:27 AM

## 2017-11-22 NOTE — PROGRESS NOTES
SOCIAL HISTORY    Social History   Substance Use Topics    Smoking status: Former Smoker    Smokeless tobacco: Not on file    Alcohol use No       ALLERGIES    No Known Allergies    MEDICATIONS    No current facility-administered medications on file prior to encounter. Current Outpatient Prescriptions on File Prior to Encounter   Medication Sig Dispense Refill    levothyroxine (SYNTHROID) 25 MCG tablet Take 25 mcg by mouth Daily      miconazole (MICOTIN) 2 % powder Apply topically 2 times daily.  45 g 1    acetaminophen (TYLENOL) 325 MG tablet 2 tablets by Per NG tube route every 4 hours as needed for Pain or Fever 120 tablet 3    simvastatin (ZOCOR) 40 MG tablet 0.5 tablets by Per G Tube route nightly 30 tablet 3    potassium chloride (KLOR-CON) 20 MEQ packet 20 mEq by Per G Tube route daily 30 each 3    aspirin 81 MG tablet Take 1 tablet by mouth daily 30 tablet 3    warfarin (COUMADIN) 5 MG tablet 1 tablet by Per G Tube route daily 30 tablet 3    metoprolol tartrate (LOPRESSOR) 25 MG tablet 1 tablet by Per G Tube route daily 60 tablet 3    vitamin B-12 (CYANOCOBALAMIN) 1000 MCG tablet 1 tablet by Per G Tube route daily 30 tablet 3    donepezil (ARICEPT) 10 MG tablet 1 tablet by Per G Tube route nightly 30 tablet 3    Ergocalciferol (VITAMIN D2) 2000 UNITS TABS 1 tablet by Per G Tube route daily 30 tablet 11       Objective    BP (!) 104/52   Pulse 89   Temp 98.8 °F (37.1 °C) (Axillary)   Resp 18   Ht 5' 1.81\" (1.57 m)   Wt 155 lb 13.8 oz (70.7 kg)   SpO2 96%   BMI 28.68 kg/m²     LABS:  WBC:    Lab Results   Component Value Date    WBC 14.0 11/21/2017     H/H:    Lab Results   Component Value Date    HGB 10.1 11/21/2017    HCT 31.8 11/21/2017     PTT:    Lab Results   Component Value Date    APTT 60.6 11/20/2017   [APTT}  PT/INR:    Lab Results   Component Value Date    PROTIME 51.6 11/22/2017    PROTIME 31.7 04/16/2013    INR 4.4 11/22/2017     HgBA1c:    Lab Results   Component Value Date    LABA1C 5.5 04/08/2016       Assessment   Mike Risk Score: Mike Scale Score: 10    Patient Active Problem List   Diagnosis Code    CAP (community acquired pneumonia) J18.9    Chronic atrial fibrillation ([facility] I48.2    Hyperlipidemia W61.3    Metabolic encephalopathy C56.41    UTI (urinary tract infection) N39.0    Dementia without behavioral disturbance F03.90    Lymph edema I89.0    Essential hypertension I10    Hypotension I95.9    Sepsis due to pneumonia ([facility] J18.9, A41.9    Pneumonia due to organism J18.9    Streptococcal bacteremia R78.81    [facility] J13    Urinary tract infection due to Klebsiella species N39.0, B96.1    Late onset Alzheimer's disease without behavioral disturbance G30.1, F02.80    Oropharyngeal dysphagia R13.12    Malnutrition ([facility]) E46    Gastric bypass status for obesity Z98.84    PEG (percutaneous endoscopic gastrostomy) status ([facility]) Z93.1    Dysphagia R13.10    Pneumoperitoneum K66.8    Pneumonia J18.9    Hypothyroid E03.9    Chronic diastolic congestive heart failure ([facility]) I50.32       Measurements:  Wound 11/22/17 Buttocks Medial;Right;Left (Active)   Wound Type Wound 11/22/2017  5:05 PM   Dressing/Treatment Moisture barrier 11/22/2017  5:05 PM   Wound Assessment Fragile;Painful;Red 11/22/2017  5:05 PM   Drainage Amount Small 11/22/2017  5:05 PM   Drainage Description Serosanguinous 11/22/2017  5:05 PM   Odor None 11/22/2017  5:05 PM   Number of days: 0       Response to treatment:  tolerated by patient. Plan   Plan of Care: Wound 11/22/17 Buttocks Medial;Right;Left-Dressing/Treatment: Moisture barrier  · Encourage/assist with repositioning every 2 hours  · Float heels off of bed surface with pillows  · Routine incontinence care with Allen barrier cloths and zinc oxide cream. Apply zinc oxide cream BID and prn incontinence.  Mix cream with antifungal powder  · Covidien moisture wicking under pads vs cloth backed

## 2017-11-22 NOTE — FLOWSHEET NOTE
11/22/17 1002   Encounter Summary   Services provided to: Patient   Referral/Consult From: [facility]   Continue Visiting (11/22/17)   Volunteer Visit Yes   Complexity of Encounter Low   Length of Encounter 15 minutes   Spiritual/Mormonism   Type Spiritual support   Intervention Prayer   Sacraments   Communion Patient is currently unable

## 2017-11-22 NOTE — CARE COORDINATION
HIMANSHU received the final DC orders for this patient to return to [facility]. HIMANSHU set up transportation and informed the staff here, the facility, and HIMANSHU called the patient 's son (first contact), he did not answer but HIMANSHU did leave him a message. The patient is scheduled to be picked up at 10:00pm via stretcher with O2 by Fallon Christian. HIMANSHU did provide this patient 's nurse with the number for report.

## 2017-11-23 LAB
CULTURE: ABNORMAL
Lab: ABNORMAL
Lab: ABNORMAL
SPECIMEN DESCRIPTION: ABNORMAL
SPECIMEN DESCRIPTION: ABNORMAL
STATUS: ABNORMAL

## 2017-11-26 LAB
CULTURE: NORMAL
CULTURE: NORMAL
Lab: NORMAL
Lab: NORMAL
SPECIMEN DESCRIPTION: NORMAL
SPECIMEN DESCRIPTION: NORMAL
STATUS: NORMAL

## 2019-02-04 NOTE — PROGRESS NOTES
Pharmacy Note  Vancomycin Consult    Grupo Soriano is a 80 y.o. female started on Vancomycin for [facility]; consult received from Dr. Castellanos Agent to manage therapy. Also receiving the following antibiotics: Zosyn. Patient Active Problem List   Diagnosis    CAP (community acquired pneumonia)    Chronic atrial fibrillation ([address]    Hyperlipidemia    Metabolic encephalopathy    UTI (urinary tract infection)    Dementia without behavioral disturbance    Lymph edema    Essential hypertension    Hypotension    Sepsis due to pneumonia ([facility]    Pneumonia due to organism    Streptococcal bacteremia    Pneumococcal pneumonia ([facility]    Urinary tract infection due to Klebsiella species    Late onset Alzheimer's disease without behavioral disturbance    Oropharyngeal dysphagia    Malnutrition ([facility]    Gastric bypass status for obesity    PEG (percutaneous endoscopic gastrostomy) status ([facility]    Dysphagia    Pneumoperitoneum    Pneumonia    Hypothyroid    Chronic diastolic congestive heart failure (HCC)       Allergies:  Review of patient's allergies indicates no known allergies. Temp max: 97.1 F    Recent Labs      11/20/17   0925  11/21/17   0526   BUN  32*  23       Recent Labs      11/20/17   0925  11/21/17   0526   CREATININE  <0.40*  <0.40*       Recent Labs      11/20/17   0925  11/21/17   0526   WBC  16.5*  14.0*         Intake/Output Summary (Last 24 hours) at 11/21/17 1209  Last data filed at 11/21/17 8778   Gross per 24 hour   Intake 1428 ml   Output 0 ml   Net 1428 ml       Culture Date      Source                       Results  11/20                   blood x2                    coag neg staph - pending    Ht Readings from Last 1 Encounters:   11/20/17 5' 1.81\" (1.57 m)        Wt Readings from Last 1 Encounters:   11/20/17 132 lb (59.9 kg)         Body mass index is 24.29 kg/m².     CrCl cannot be calculated (This lab value cannot be used to calculate CrCl because it is not a number: <0. 40). Assessment/Plan:  Will initiate vancomycin 1000 mg IV every 12 hours, first dose given in ER yesterday morning, second dose last night. Timing of trough level will be determined based on culture results, renal function, and clinical response. Thank you for the consult. Will continue to follow.     Jenna Coyle,PharmD, 11/21/2017, 12:11 PM  referral

## 2020-11-03 PROBLEM — J18.9 PNEUMONIA DUE TO ORGANISM: Status: RESOLVED | Noted: 2020-11-03 | Resolved: 2020-11-03

## (undated) DEVICE — AIRLIFE™ NASAL OXYGEN CANNULA CURVED, FLARED TIP, WITH 7 FEET (2.1 M) CRUSH RESISTANT TUBING, OVER-THE-EAR STYLE: Brand: AIRLIFE™

## (undated) DEVICE — URETHRAL DILATOR SET: Brand: COOK

## (undated) DEVICE — BITEBLOCK 54FR W/ DENT RIM BLOX

## (undated) DEVICE — DEFENDO AIR WATER SUCTION AND BIOPSY VALVE KIT FOR  OLYMPUS: Brand: DEFENDO AIR/WATER/SUCTION AND BIOPSY VALVE

## (undated) DEVICE — TUBE GASTROSTMY 20FR MED GRD SIL FEED GAM RECESS DST TIP 010020] AVANOS MEDICAL]

## (undated) DEVICE — SPONGE DRN W4XL4IN RAYON/POLYESTER 6 PLY NONWOVEN PRECUT

## (undated) DEVICE — MIC* SAFETY PERCUTANEOUS ENDOSCOPIC GASTROSTOMY PEG KIT - 20 FR - PUSH OTW: Brand: MIC PEG TUBE